# Patient Record
Sex: FEMALE | Race: WHITE | Employment: OTHER | URBAN - METROPOLITAN AREA
[De-identification: names, ages, dates, MRNs, and addresses within clinical notes are randomized per-mention and may not be internally consistent; named-entity substitution may affect disease eponyms.]

---

## 2020-06-10 ENCOUNTER — HOSPITAL ENCOUNTER (OUTPATIENT)
Dept: WOMENS IMAGING | Age: 65
Discharge: HOME | End: 2020-06-10
Payer: MEDICARE

## 2020-06-10 PROCEDURE — 77067 SCR MAMMO BI INCL CAD: CPT

## 2020-06-24 ENCOUNTER — HOSPITAL ENCOUNTER (OUTPATIENT)
Dept: ULTRASOUND IMAGING | Age: 65
Discharge: HOME | End: 2020-06-24
Payer: MEDICARE

## 2020-06-24 ENCOUNTER — HOSPITAL ENCOUNTER (OUTPATIENT)
Dept: WOMENS IMAGING | Age: 65
Discharge: HOME | End: 2020-06-24
Payer: MEDICARE

## 2020-06-24 PROCEDURE — G0279 TOMOSYNTHESIS, MAMMO: HCPCS

## 2020-06-24 PROCEDURE — 76642 ULTRASOUND BREAST LIMITED: CPT

## 2021-01-11 ENCOUNTER — TELEPHONE (OUTPATIENT)
Dept: ORTHOPEDIC SURGERY | Age: 66
End: 2021-01-11

## 2021-01-11 NOTE — TELEPHONE ENCOUNTER
Other Patient is calling and would like a call back to confirm the office has received her records from [facility] SITE with [place].  ph [phone]

## 2021-01-14 ENCOUNTER — OFFICE VISIT (OUTPATIENT)
Dept: ORTHOPEDIC SURGERY | Age: 66
End: 2021-01-14
Payer: MEDICARE

## 2021-01-14 VITALS — HEIGHT: 66 IN | TEMPERATURE: 97.2 F | BODY MASS INDEX: 29.41 KG/M2 | WEIGHT: 183 LBS

## 2021-01-14 DIAGNOSIS — M17.12 PRIMARY OSTEOARTHRITIS OF LEFT KNEE: Primary | ICD-10-CM

## 2021-01-14 PROCEDURE — 99203 OFFICE O/P NEW LOW 30 MIN: CPT | Performed by: ORTHOPAEDIC SURGERY

## 2021-01-14 RX ORDER — LORATADINE 10 MG/1
10 CAPSULE, LIQUID FILLED ORAL DAILY PRN
COMMUNITY

## 2021-01-14 RX ORDER — NAPROXEN SODIUM 220 MG
220 TABLET ORAL 2 TIMES DAILY WITH MEALS
Status: ON HOLD | COMMUNITY
End: 2021-02-23 | Stop reason: SDUPTHER

## 2021-01-14 RX ORDER — CLOBETASOL PROPIONATE 0.46 MG/ML
1 SOLUTION TOPICAL 2 TIMES DAILY
COMMUNITY
Start: 2020-06-23 | End: 2021-01-14 | Stop reason: CLARIF

## 2021-01-14 RX ORDER — RANITIDINE HCL 75 MG
75 TABLET ORAL 2 TIMES DAILY
Status: ON HOLD | COMMUNITY
End: 2021-02-23

## 2021-01-14 RX ORDER — PETROLATUM,WHITE/LANOLIN
1000 OINTMENT (GRAM) TOPICAL
COMMUNITY

## 2021-01-14 RX ORDER — OMEGA-3 FATTY ACIDS/FISH OIL 300-1000MG
200 CAPSULE ORAL
Status: ON HOLD | COMMUNITY
End: 2021-02-23 | Stop reason: SDUPTHER

## 2021-01-14 RX ORDER — OMEPRAZOLE 20 MG/1
20 CAPSULE, DELAYED RELEASE ORAL PRN
COMMUNITY

## 2021-01-14 SDOH — HEALTH STABILITY: MENTAL HEALTH: HOW OFTEN DO YOU HAVE A DRINK CONTAINING ALCOHOL?: NOT ASKED

## 2021-01-14 NOTE — PROGRESS NOTES
[facility]  Office Visit    Chief Complaint: Bilateral knee pain    HPI:  Merrick Fang is a 72 y.o. who is here for evaluation of bilateral knee pain, worse on the left. She has a known history of bilateral knee osteoarthritis for a number of years. She is previously treated for this in [place] before moved back to [place]. She had stem cell steroid injections done in [place] as well as viscosupplementation. Dr. Tamar Aguilar performed bone marrow concentrate stem cell injections in both knees in May 2019. She believes the stem cell therapy has helped. She describes knee pain as far back as 2008. The pain is more medial on both sides and feels aching and stiff. She walks with a limp. She has also seen Dr. April Dozier at [facility], who recommended total knee replacement. Her sister works in physical therapy at [facility] [employer]. She also describes aching muscles in the back of her legs and calves, hip pain which is lateral, pain from her bunions. Medical history includes acid reflux. She does report that nickel jewelry used to make her skin itch. There is no history of DVT. She is not on any blood thinners. She does not have diabetes. There is no problem list on file for this patient. ROS:  Constitutional: denies fever, chills, weight loss  MSK: denies pain in other joints, muscle aches  Neurological: denies numbness, tingling, weakness    Exam:  Temperature 97.2 °F (36.2 °C), temperature source Infrared, height 5' 6\" (1.676 m), weight 183 lb (83 kg). Appearance: sitting in exam room chair, appears to be in no acute distress, awake and alert  Resp: unlabored breathing on room air  Skin: warm, dry and intact with out erythema or significant increased temperature  Neuro: grossly intact both lower extremities. Intact sensation to light touch. Motor exam 4+ to 5/5 in all major motor groups. MSK: BLE - Examination reveals that range of motion is 5 to 125 degrees. There is varus deformity, positive crepitus, positive joint line tenderness, antalgic gait. Neurologically, plantar flexion and dorsiflexion is intact. Pulses palpable distally. 5/5 strength. Imaging:  Bilateral knee radiographs performed previously were reviewed and significant for severe tricompartmental arthritis of the left knee with bone-on-bone offer neuritis of the medial compartment. Left knee x-ray also demonstrate tricompartmental osteoarthritis. Assessment:  Bilateral knee osteoarthritis, more symptomatic on the left knee    Plan:  We discussed the diagnosis and treatment options. She is currently taking Naprosyn for pain. She has been performing home based physical therapy exercises. She has had a number of steroid injections, viscosupplementation, PRP injections. We discussed total knee arthroplasty at length. The operative procedure, alternatives, and risks were discussed in detail with the patient. The risks include but are not limited to: Infection, vessel injury, nerve injury, DVT, pulmonary embolism, implant loosening, need for revision surgery, loss of motion, continued pain. All questions have been answered and no guarantees have been made. The patient is unable to do further physical therapy due to disabling pain. I discussed with the patient the diagnosis in detail and answered all the questions. The patient verbalized understanding of the plan as it has been described above and is in agreement. She will call if she would like to proceed with left total knee arthroplasty. She will need to undergo metal allergy testing preoperatively. Total time spent on today's encounter was between 30-44 minutes. This time included reviewing prior notes, radiographs, and lab results when available, reviewing history obtained by medical assistant, performing history and physical exam, reviewing tests/radiographs with the patient, counseling the patient, ordering medications or tests, documentation in the electronic health record, and coordination of care. This dictation was done with Dragon dictation and may contain mechanical errors related to translation.

## 2021-02-01 ENCOUNTER — PREP FOR PROCEDURE (OUTPATIENT)
Dept: ORTHOPEDIC SURGERY | Age: 66
End: 2021-02-01

## 2021-02-01 ENCOUNTER — TELEPHONE (OUTPATIENT)
Dept: ORTHOPEDIC SURGERY | Age: 66
End: 2021-02-01

## 2021-02-01 DIAGNOSIS — M17.12 PRIMARY OSTEOARTHRITIS OF LEFT KNEE: Primary | ICD-10-CM

## 2021-02-01 NOTE — TELEPHONE ENCOUNTER
Pt wants to schedule L TKA. No letter. Pt also wants to know if there is a video or a place to get information from.     Also wants to know approach, from side or front of knee        [phone]

## 2021-02-01 NOTE — TELEPHONE ENCOUNTER
I called to discuss surgery with the patient. I answered her question about approach and recovery. She is asking about financials. She also has a very remote history of some itchiness while wearing nickel jewelry. We discussed this and I would not pursue metal allergy testing or change implants.

## 2021-02-01 NOTE — LETTER
allergies. SURG   2-23          JET    2-15                        __________________________________________________________________  PRE-OP ORDERS:  ? CBC WITH DIFFERENTIAL                                                ? TYPE AND SCREEN                                                            ? HgB A1C                                                                               ? EKG                                                                                        ? NASAL CULUTRE MRSA  ? UAR/if positive repeat UAR on admission  ? BMP           ALBUMIN AND PREALBUMIN           VITAMIN D LEVELS  ? COAG PROFILE  ? SED RATE  ? PT/OT EVAL AND TEACHING  ? INSTRUCT PT TO STOP ALL NSAIDS, ASPIRIN, BLOOD THINNERS 7 DAYS PRIOR SURGERY  DAY OF SURGERY  ? CEFAZOLIN 2 GM IVPB; IF PATIENT WEIGHS > 80 KG AND SERUM CREATININE <2.5 mg/dl, GIVE 2 GM DOSE WITHIN 1 HOUR OF INCISION. ? IF THE PRE-OP NASAL CULTURE FOR MRSA WAS POSITIVE:   REPEAT NASAL SWAB ON ADMISSION AND ADMINISTER VANCOMYCIN 15 mg x kg, REDUCE THE DOSE OF VANCOMYCIN  MG IVPB IF PT < 55 KG OR SERUM CREATININE > 2mg/dl; also to get Cefazolin 2 GM or wt based  ? All patients will receive preop Cefazolin 2 GM or wt based   ? APPLY KNEE HIGH ANTI-EMBOLIC AND PNEUMO-BOOTS TO UNOPERATIVE  LEG  ? CELEBREX  200 MG  ORALLY  DAY OF SURGERY  ?  ROXICODONE 10MG  ORALLY DAY OF SURGERY  OTHER ORDERS:_______________________________________________________PHYSICIAN SIGNATURE: __   2/1/21                                                                                                       4:10 PM  ________________________DATE:

## 2021-02-06 ENCOUNTER — HOSPITAL ENCOUNTER (OUTPATIENT)
Dept: CT IMAGING | Age: 66
Discharge: HOME | End: 2021-02-06
Payer: MEDICARE

## 2021-02-06 DIAGNOSIS — M17.12 PRIMARY OSTEOARTHRITIS OF LEFT KNEE: ICD-10-CM

## 2021-02-06 PROCEDURE — 73700 CT LOWER EXTREMITY W/O DYE: CPT

## 2021-02-08 ENCOUNTER — TELEPHONE (OUTPATIENT)
Dept: ORTHOPEDIC SURGERY | Age: 66
End: 2021-02-08

## 2021-02-08 ENCOUNTER — PREP FOR PROCEDURE (OUTPATIENT)
Dept: ORTHOPEDICS UNIT | Age: 66
End: 2021-02-08

## 2021-02-08 RX ORDER — OXYCODONE HYDROCHLORIDE 10 MG/1
10 TABLET ORAL ONCE
Status: CANCELLED | OUTPATIENT
Start: 2021-02-08 | End: 2021-02-08

## 2021-02-08 RX ORDER — CELECOXIB 200 MG/1
200 CAPSULE ORAL ONCE
Status: CANCELLED | OUTPATIENT
Start: 2021-02-08 | End: 2021-02-08

## 2021-02-08 NOTE — DISCHARGE INSTR - ACTIVITY
Activity:  ? Elevate your leg if swelling occurs in your ankle. Use elastic wraps/hose until swelling decreases. ? Continue the exercise program as prescribed by physical therapists. ? Take frequent walks. ? Use walker, crutches, or cane with weight bearing instructions as indicated by the physical therapists. ? Take rest periods often. Elevate leg during rest period.   As tolerated with a walker

## 2021-02-12 ENCOUNTER — HOSPITAL ENCOUNTER (OUTPATIENT)
Dept: PHYSICAL THERAPY | Age: 66
Setting detail: THERAPIES SERIES
Discharge: HOME | End: 2021-02-12
Payer: MEDICARE

## 2021-02-12 PROCEDURE — 97110 THERAPEUTIC EXERCISES: CPT

## 2021-02-12 PROCEDURE — 97161 PT EVAL LOW COMPLEX 20 MIN: CPT

## 2021-02-12 NOTE — PLAN OF CARE
[facility]. [address]  Phone: [phone]   Fax:     [phone]          Physical Therapy Certification    Dear Referring Practitioner: Dr Edilson Hernadez,    We had the pleasure of evaluating the following patient for physical therapy services at [facility]. A summary of our findings can be found in the initial assessment below. This includes our plan of care. If you have any questions or concerns regarding these findings, please do not hesitate to contact me at the office phone number checked above. Thank you for the referral.       Physician Signature:_______________________________Date:__________________  By signing above (or electronic signature), therapists plan is approved by physician        Patient: Willie Boone   : 1955   MRN: [id number]  Referring Physician: Referring Practitioner: Dr Edilson Hernadez      Evaluation Date: 2021      Medical Diagnosis Information:  Diagnosis: M17.12 (ICD-10-CM) - Primary osteoarthritis of left knee   Treatment Diagnosis: decreased abilty to ambulate and function                                         Insurance information: PT Insurance Information: bcbs Medicare     Precautions/ Contra-indications:  Latex Allergy:  [x]NO      []YES  Preferred Language for Healthcare:   [x]English       []other:    C-SSRS Triggered by Intake questionnaire (Past 2 wk assessment ):   [x] No, Questionnaire did not trigger screening.   [] Yes, Patient intake triggered C-SSRS Screening      [] C-SSRS Screening completed  [] PCP notified via Epic     SUBJECTIVE: Patient is a 76 y/o female with a hx of left  knee pain for months and is scheduled to have a left tka on 21. She c/o constant aching pain in her left knee which gets worse with steps and prolonged wb. She hopes to have a successful surgery.      Relevant Medical History:Additional Pertinent Hx: cancer  Functional Outcome Measure:[place]- 51    Pain Scale:5/10  Easing factors: rest  Provocative factors: use     Type: [x]Constant   []Intermittent  []Radiating []Localized []other:         Occupation/School- retired    Hospital:    [] Total Hip Replacement [] Right  [] Left  DOS: 2/23/21  [] Not yet scheduled  [x] Total Knee Replacement [] Right  [x] Left    [x] Prehab Eval  [] Prehab D/C  [] Post - OP Visit             Weeks from sx [] Post-op D/C    DME ASSESSMENT:   Current available equipment:    [] Std. Cheri Pedlar       [] Rolling walker      [] 4 wheeled walker     [] Evia Anchors     [] Straight cane     [] Crutches   [] Reacher            [] Sock Aid              [] Shower chair            [] Leg           [] Long handle shoe horn   [] Other:     Equipment needed at discharge from hospital:   [] Std. Cheri Pedlar       [x] Rolling walker      [] 4 wheeled walker     [] Evia Anchors     [x] Straight cane     [] Crutches   [] Reacher            [] Sock Aid              [x] Shower chair            [] Leg           [] Long handle shoe horn   [] Other:       SOCIAL ASSESSMENT:  1. Will you live with someone who can care for you after surgery? [x] Yes  [] No  2. Where is the bathroom located in your post-surgery place of residence? [x] 1st Floor [] 2nd Floor  3. Where is the bedroom located in your post-surgery place of residence? [x] 1st Floor [] 2nd Floor  4. Do you use community supports (home help, meals-on-wheels, district nurse)? [x] None or 1 per week  [] 2 or more per week  5. How many stairs will you have to climb to get in to your place of residence? [x] Less than 5  [] More than 5  6. Have you had a fall in the past year? [] Yes  [x] No     Notes:     Available PT Visits:  Per insurance    BMI:    Height    Weight      FUNCTIONAL ASSESSMENT:   1. Do you use ambulatory aids? [x] None [] Single Point Cane  [] Crutches/Walker/Wheelchair  2.  How far on average can you walk? [x] 2 Blocks or more  [] 1-2 Blocks  [] House bound most of the time  3. What is your physical activity level? [x] Highly Active [] Active  [] Somewhat active  [] Sedentary     OBJECTIVE:   Palpation:-decreased prox tib fib mob, patellar mobs, very tight in itb, add, gastroc, quads    Quad: -good contraction    Functional Mobility/Transfers: ind    Posture: good        Gait: - ambulates with moderate limp, decreased heel toe gait, decreased step length                                                          Reflexes Normal Abnormal Comments                                      PROM AROM    L R L R   Hip Flexion 100 100 100 100   Knee Flexion 115 125 110 125   Knee Extension -10 0 -15 0       Strength (0-5) Left Right   Hip Flexion - supine 4 5 all   Hip Flexion - seated 4    Hip Abduction - sidelyling 45# 60#   Hip Adduction 4    Hip Extension 4    Quads 30# 50#   Hamstrings 4         Flexibility     Hamstrings (90/90) -20    ITB (Ольга) tight    Quads (Ely's)     Hip Flexor Lawence Bernadette)          Girth     Mid patella     Suprapatellar         Joint mobility: patellofemoral    []Normal    [x]Hypo   []Hyper         Functional Testing  Sx Date Prehab Date 2/12/21 Post-op Re-Eval Date  4 week F/U date  8 week F/U date  D/C Date       TUG (sec) 8       30 second sit to stand (reps) 9       6 minute walk (m) na          Balance:    Narrowed DANISH (sec) 5       Semi Tandem (sec)   4             Tandem (sec)   6               SLS (sec) 5              R L R L R L R L R                                     L R L R L R                                     L R L R L R                                                               [x] Patient history, allergies, meds reviewed. Medical chart reviewed. See intake form. Review Of Systems (ROS):  [x]Performed Review of systems (Integumentary, CardioPulmonary, Neurological) by intake and observation. Intake form has been scanned into medical record.  Patient has been instructed to contact their primary care physician regarding ROS issues if not already being addressed at this time. Co-morbidities/Complexities (which will affect course of rehabilitation):  []None           Arthritic conditions   []Rheumatoid arthritis (M05.9)  []Osteoarthritis (M19.91)   Cardiovascular conditions   []Hypertension (I10)  []Hyperlipidemia (E78.5)  []Angina pectoris (I20)  []Atherosclerosis (I70)   Musculoskeletal conditions   []Disc pathology   []Congenital spine pathologies   []Prior surgical intervention  []Osteoporosis (M81.8)  []Osteopenia (M85.8)   Endocrine conditions   []Hypothyroid (E03.9)  []Hyperthyroid Gastrointestinal conditions   []Constipation (I99.16)   Metabolic conditions   []Morbid obesity (E66.01)  []Diabetes type 1(E10.65) or 2 (E11.65)   []Neuropathy (G60.9)     Pulmonary conditions   []Asthma (J45)  []Coughing   []COPD (J44.9)   Psychological Disorders  []Anxiety (F41.9)  []Depression (F32.9)   []Other:   [x]Other:     cancer     Barriers to/and or personal factors that will affect rehab potential:              []Age  []Sex    []Smoker              []Motivation/Lack of Motivation                        []Co-Morbidities              []Cognitive Function, education/learning barriers              []Environmental, home barriers              []profession/work barriers  []past PT/medical experience  []other:  Justification:     Falls Risk Assessment (30 days):   [x] Falls Risk assessed and no intervention required.   [] Falls Risk assessed and Patient requires intervention due to being higher risk   TUG score (>12s at risk):     [] Falls education provided, including         ASSESSMENT:   Functional Impairments:     []Noted lumbar/proximal hip/LE joint hypomobility   [x]Decreased LE functional ROM   [x]Decreased core/proximal hip strength and neuromuscular control   [x]Decreased LE functional strength   [x]Reduced balance/proprioceptive control   []other:      Functional Activity Limitations (from functional questionnaire and intake)   []Reduced ability to tolerate prolonged functional positions   []Reduced ability or difficulty with changes of positions or transfers between positions   []Reduced ability to maintain good posture and demonstrate good body mechanics with sitting, bending, and lifting   []Reduced ability to sleep   [] Reduced ability or tolerance with driving and/or computer work   [x]Reduced ability to perform lifting, carrying tasks   []Reduced ability to squat   []Reduced ability to forward bend   [x]Reduced ability to ambulate prolonged functional periods/distances/surfaces   [x]Reduced ability to ascend/descend stairs   [x]Reduced ability to run, hop, cut or jump   []other:    Participation Restrictions   [x]Reduced participation in self care activities   [x]Reduced participation in home management activities   [x]Reduced participation in work activities   [x]Reduced participation in social activities. [x]Reduced participation in sport/recreation activities. Classification :    [x]Signs/symptoms consistent with post-surgical status including decreased ROM, strength and function.    []Signs/symptoms consistent with joint sprain/strain   []Signs/symptoms consistent with patella-femoral syndrome   [x]Signs/symptoms consistent with knee OA/hip OA   [x]Signs/symptoms consistent with internal derangement of knee/Hip   [x]Signs/symptoms consistent with functional hip weakness/NMR control      []Signs/symptoms consistent with tendinitis/tendinosis    []signs/symptoms consistent with pathology which may benefit from Dry needling      []other:      Prognosis/Rehab Potential:      []Excellent   [x]Good    []Fair   []Poor    Tolerance of evaluation/treatment:    []Excellent   [x]Good    []Fair   []Poor    Physical Therapy Evaluation Complexity Justification  [x] A history of present problem with:  [] no personal factors and/or comorbidities that impact the plan of post-operative outcomes. The patient was educated on and instructed in HEP as listed. The patient was given a detailed handout for exercises to initiate in the hospital post-operatively as well as at home. The discharge plan from the hospital was reviewed with the patient; specifically, to reduce length of hospital stay and to minimize time before reinitiating outpatient physical therapy after surgery. Education regarding early mobility post-operatively in the hospital and emphasis on working with both physical therapy and nursing staff to achieve ambulation goal of 150 feet was provided. The patient was highly encouraged to attend joint class in hospital prior to surgery for further instructions on pre and post-surgical care. Also, education regarding goals and expectations was provided; specifically, knee flexion range of motion greater than or equal to 90 degrees and 0 degrees knee extension to promote improved gait mechanics and ambulation. The patient was encouraged to utilize ice/cold pack after surgery to address pain, minimize swelling as often as possible. It is in my medical opinion that this patient is clear from all physical barriers prior to consideration for surgery, activity modifications prior to and post operatively have been discussed with this patient as well as discharge planning and is cleared for surgery from physical therapy perspective. GOALS:  Patient stated goal:\" I want to learn exercises to help with surgery \"  [] Progressing: [] Met: [] Not Met: [] Adjusted    Therapist goals for Patient:   Short Term Goals: To be achieved in: 1 visit  1. Independent in HEP and progression per patient tolerance, in order to prevent re-injury. [] Progressing: [] Met: [] Not Met: [] Adjusted  2. All patient questions regarding expectations for rehab following upcoming surgery are answered.    [] Progressing: [] Met: [] Not Met: [] Adjusted    Long Term Goals: long term goals to be determined at re-eval following upcoming surgery    Electronically signed by:  Adrian Hayes, PT

## 2021-02-12 NOTE — FLOWSHEET NOTE
Quad/Glute set 10x 6 sec holds         Therapeutic Activity (80624)  Min:          Gait Training (62464)  Min:           Modalities  Min:            Other Therapeutic Activities:   Patient was thoroughly educated on this date regarding prehabilitation goals, importance of PT sessions in improving overall ROM, strength and stability prior to surgery, and how prehabilitation will facilitate improved post-operative outcomes. The patient was educated on and instructed in HEP as listed. The patient was given a detailed handout for exercises to initiate in the hospital post-operatively as well as at home. The discharge plan from the hospital was reviewed with the patient; specifically, to reduce length of hospital stay and to minimize time before reinitiating outpatient physical therapy after surgery. Education regarding early mobility post-operatively in the hospital and emphasis on working with both physical therapy and nursing staff to achieve ambulation goal was provided. The patient was highly encouraged to attend joint class in hospital prior to surgery for further instructions on pre and post-surgical care. Also, education regarding goals and expectations was provided; specifically, knee flexion range of motion greater than or equal to 90 degrees and 0 degrees knee extension to promote improved gait mechanics and ambulation. The patient was encouraged to utilize ice/cold pack after surgery to address pain, minimize swelling as often as possible. It is in my medical opinion that this patient is clear from all physical barriers prior to consideration for surgery, activity modifications prior to and post operatively have been discussed with this patient as well as discharge planning and is cleared for surgery from physical therapy perspective. Home Exercise Program:  Patient instructed in the above exercises for HEP. Patient verbalized/demonstrated understanding and was issued written handout. Therapeutic Exercise and NMR EXR  [] (84110) Provided verbal/tactile cueing for activities related to strengthening, flexibility, endurance, ROM for improvements in LE, proximal hip, and core control with self care, mobility, lifting, ambulation.  [] (29608) Provided verbal/tactile cueing for activities related to improving balance, coordination, kinesthetic sense, posture, motor skill, proprioception  to assist with LE, proximal hip, and core control in self care, mobility, lifting, ambulation and eccentric single leg control.  [address] and Therapeutic Activities:    [] (93212 or 98245) Provided verbal/tactile cueing for activities related to improving balance, coordination, kinesthetic sense, posture, motor skill, proprioception and motor activation to allow for proper function of core, proximal hip and LE with self care and ADLs and functional mobility.   [] (69476) Gait Re-education- Provided training and instruction to the patient for proper LE, core and proximal hip recruitment and positioning and eccentric body weight control with ambulation re-education including up and down stairs     Gait Training:  [] (46029) Provided training and instruction to the patient for proper postural muscle recruitment and positioning with ambulation re-education     Home Exercise Program:    [x] (53471) Reviewed/Progressed HEP activities related to strengthening, flexibility, endurance, ROM of core, proximal hip and LE for functional self-care, mobility, lifting and ambulation/stair navigation   [] (97211)Reviewed/Progressed HEP activities related to improving balance, coordination, kinesthetic sense, posture, motor skill, proprioception of core, proximal hip and LE for self care, mobility, lifting, and ambulation/stair navigation      Manual Treatments:  PROM / STM / Oscillations-Mobs:  G-I, II, III, IV (PA's, Inf., Post.)  [] (90219) Provided manual therapy to mobilize LE, proximal hip and/or LS spine soft tissue/joints for the purpose of modulating pain, promoting relaxation,  increasing ROM, reducing/eliminating soft tissue swelling/inflammation/restriction, improving soft tissue extensibility and allowing for proper ROM for normal function with self care, mobility, lifting and ambulation. Charges:  Timed Code Treatment Minutes: 30   Total Treatment Minutes: 45      [x] EVAL (LOW) 77411 (typically 20 minutes face-to-face)  [] EVAL (MOD) 01999 (typically 30 minutes face-to-face)  [] EVAL (HIGH) 10173 (typically 45 minutes face-to-face)  [] RE-EVAL     [x] JL(93345) x  2   [] Dry needle 1 or 2 Muscles (82366)  [] NMR (14562) x     [] Dry needle 3+ Muscles (69396)  [] Manual (30414) x     [] Ultrasound (51658) x  [] TA (32137) x     [] Mech Traction (43310)  [] ES(attended) (19849)     [] ES (un) (48288):   [] Vasopump (16704) [] Ionto (45120)   [] Gait (83860)  [] Other:        ASSESSMENT:  See eval    Treatment/Activity Tolerance:  [x] Patient tolerated treatment well [] Patient limited by fatique  [] Patient limited by pain  [] Patient limited by other medical complications  [] Other:     Overall Progression Towards Functional goals/ Treatment Progress Update:  [] Patient is progressing as expected towards functional goals listed. [] Progression is slowed due to complexities/Impairments listed. [] Progression has been slowed due to co-morbidities. [x] Plan just implemented, too soon to assess goals progression <30days   [] Goals require adjustment due to lack of progress  [] Patient is not progressing as expected and requires additional follow up with physician  [] Other    Prognosis for POC: [x] Good [] Fair  [] Poor    Patient requires continued skilled intervention: [] Yes  [x] No  GOALS:  Patient stated goal:\" I want to learn exercises to help with surgery \"  []? Progressing: [x]? Met: []? Not Met: []? Adjusted     Therapist goals for Patient:   Short Term Goals: To be achieved in: 1 visit  1.  Independent in HEP and progression per patient tolerance, in order to prevent re-injury. []? Progressing: [x]? Met: []? Not Met: []? Adjusted  2. All patient questions regarding expectations for rehab following upcoming surgery are answered. []? Progressing: [x]? Met: []? Not Met: []? Adjusted         PLAN:Patient seen for 1 visit for HEP. Patient is a good candidate for TKR surgery and would benefit from outpatient rehab following surgery. [] Continue per plan of care [] Alter current plan (see comments)  [] Plan of care initiated [] Hold pending MD visit [x] Discharge    Electronically signed by: Javier Joe PT    Note: If patient does not return for scheduled/recommended follow up visits, this note will serve as a discharge from care along with the most recent update on progress.

## 2021-02-15 ENCOUNTER — HOSPITAL ENCOUNTER (OUTPATIENT)
Dept: PREADMISSION TESTING | Age: 66
Discharge: HOME | End: 2021-02-19
Payer: MEDICARE

## 2021-02-15 DIAGNOSIS — M17.12 PRIMARY OSTEOARTHRITIS OF LEFT KNEE: ICD-10-CM

## 2021-02-15 LAB
ABO/RH: NORMAL
ALBUMIN SERPL-MCNC: 4.3 G/DL (ref 3.4–5)
ANION GAP SERPL CALCULATED.3IONS-SCNC: 11 MMOL/L (ref 3–16)
ANTIBODY SCREEN: NORMAL
APTT: 34 SEC (ref 24.2–36.2)
BASOPHILS ABSOLUTE: 0 K/UL (ref 0–0.2)
BASOPHILS RELATIVE PERCENT: 0.5 %
BILIRUBIN URINE: NEGATIVE
BLOOD, URINE: NEGATIVE
BUN BLDV-MCNC: 15 MG/DL (ref 7–20)
CALCIUM SERPL-MCNC: 10.1 MG/DL (ref 8.3–10.6)
CHLORIDE BLD-SCNC: 102 MMOL/L (ref 99–110)
CLARITY: CLEAR
CO2: 27 MMOL/L (ref 21–32)
COLOR: YELLOW
CREAT SERPL-MCNC: 0.7 MG/DL (ref 0.6–1.2)
EKG ATRIAL RATE: 70 BPM
EKG DIAGNOSIS: NORMAL
EKG P AXIS: 42 DEGREES
EKG P-R INTERVAL: 126 MS
EKG Q-T INTERVAL: 402 MS
EKG QRS DURATION: 92 MS
EKG QTC CALCULATION (BAZETT): 434 MS
EKG R AXIS: -27 DEGREES
EKG T AXIS: 44 DEGREES
EKG VENTRICULAR RATE: 70 BPM
EOSINOPHILS ABSOLUTE: 0.3 K/UL (ref 0–0.6)
EOSINOPHILS RELATIVE PERCENT: 2.9 %
GFR AFRICAN AMERICAN: >60
GFR NON-AFRICAN AMERICAN: >60
GLUCOSE BLD-MCNC: 80 MG/DL (ref 70–99)
GLUCOSE URINE: NEGATIVE MG/DL
HCT VFR BLD CALC: 42.7 % (ref 36–48)
HEMOGLOBIN: 14.6 G/DL (ref 12–16)
INR BLD: 1.04 (ref 0.86–1.14)
KETONES, URINE: NEGATIVE MG/DL
LEUKOCYTE ESTERASE, URINE: NEGATIVE
LYMPHOCYTES ABSOLUTE: 1.6 K/UL (ref 1–5.1)
LYMPHOCYTES RELATIVE PERCENT: 18 %
MCH RBC QN AUTO: 31.5 PG (ref 26–34)
MCHC RBC AUTO-ENTMCNC: 34.3 G/DL (ref 31–36)
MCV RBC AUTO: 92 FL (ref 80–100)
MICROSCOPIC EXAMINATION: NORMAL
MONOCYTES ABSOLUTE: 0.7 K/UL (ref 0–1.3)
MONOCYTES RELATIVE PERCENT: 7.2 %
NEUTROPHILS ABSOLUTE: 6.5 K/UL (ref 1.7–7.7)
NEUTROPHILS RELATIVE PERCENT: 71.4 %
NITRITE, URINE: NEGATIVE
PDW BLD-RTO: 14 % (ref 12.4–15.4)
PH UA: 6 (ref 5–8)
PLATELET # BLD: 238 K/UL (ref 135–450)
PMV BLD AUTO: 8.2 FL (ref 5–10.5)
POTASSIUM SERPL-SCNC: 4.2 MMOL/L (ref 3.5–5.1)
PREALBUMIN: 24.7 MG/DL (ref 20–40)
PROTEIN UA: NEGATIVE MG/DL
PROTHROMBIN TIME: 12.1 SEC (ref 10–13.2)
RBC # BLD: 4.64 M/UL (ref 4–5.2)
SEDIMENTATION RATE, ERYTHROCYTE: 9 MM/HR (ref 0–30)
SODIUM BLD-SCNC: 140 MMOL/L (ref 136–145)
SPECIFIC GRAVITY UA: 1 (ref 1–1.03)
URINE REFLEX TO CULTURE: NORMAL
URINE TYPE: NORMAL
UROBILINOGEN, URINE: 0.2 E.U./DL
VITAMIN D 25-HYDROXY: 37.7 NG/ML
WBC # BLD: 9.1 K/UL (ref 4–11)

## 2021-02-15 PROCEDURE — 81003 URINALYSIS AUTO W/O SCOPE: CPT

## 2021-02-15 PROCEDURE — 85610 PROTHROMBIN TIME: CPT

## 2021-02-15 PROCEDURE — 80048 BASIC METABOLIC PNL TOTAL CA: CPT

## 2021-02-15 PROCEDURE — 86850 RBC ANTIBODY SCREEN: CPT

## 2021-02-15 PROCEDURE — 85730 THROMBOPLASTIN TIME PARTIAL: CPT

## 2021-02-15 PROCEDURE — 87081 CULTURE SCREEN ONLY: CPT

## 2021-02-15 PROCEDURE — 85652 RBC SED RATE AUTOMATED: CPT

## 2021-02-15 PROCEDURE — 84134 ASSAY OF PREALBUMIN: CPT

## 2021-02-15 PROCEDURE — 93010 ELECTROCARDIOGRAM REPORT: CPT | Performed by: INTERNAL MEDICINE

## 2021-02-15 PROCEDURE — 82306 VITAMIN D 25 HYDROXY: CPT

## 2021-02-15 PROCEDURE — 86900 BLOOD TYPING SEROLOGIC ABO: CPT

## 2021-02-15 PROCEDURE — 82040 ASSAY OF SERUM ALBUMIN: CPT

## 2021-02-15 PROCEDURE — 86901 BLOOD TYPING SEROLOGIC RH(D): CPT

## 2021-02-15 PROCEDURE — 83036 HEMOGLOBIN GLYCOSYLATED A1C: CPT

## 2021-02-15 PROCEDURE — 85025 COMPLETE CBC W/AUTO DIFF WBC: CPT

## 2021-02-15 PROCEDURE — 93005 ELECTROCARDIOGRAM TRACING: CPT

## 2021-02-15 NOTE — PROGRESS NOTES
Patient attended JET class on 2/15/21. Patient verified surgery for Total knee replacement and received patient information and educational JET folder including education handouts on hand hygiene and preventing constipation. Interviews completed by PT, OT,  and PAT. Labs and Tests completed as ordered/necessary. Patient given handout instructions on Pre-operative Showering techniques and the use of anti-septic 3 days before surgery. Anti-septic bottle given to patient to take home. Patient states no further questions or concerns. DOS: 2/23/21  Dr Carol Holt: home with [facility] and spouse gene ;if applicable. Per Patient Will see/Saw PCP on 2/18/21.        Electronically signed by Sylvester Parker RN on 2/15/2021 at 1:37 PM

## 2021-02-16 LAB
ESTIMATED AVERAGE GLUCOSE: 99.7 MG/DL
HBA1C MFR BLD: 5.1 %

## 2021-02-17 ENCOUNTER — TELEPHONE (OUTPATIENT)
Dept: ORTHOPEDIC SURGERY | Age: 66
End: 2021-02-17

## 2021-02-17 LAB — MRSA CULTURE ONLY: NORMAL

## 2021-02-17 NOTE — TELEPHONE ENCOUNTER
Donte At [address] [place] [address] 2/15/2021  9:57 AM   Telephone Encounter              Added by:  Ileana Gonzalez    []Naty for details  PENDING DENIAL  Date: 2/23/2021  Type of SX: Outpatient  Location: [facility]  CPT: 61930  SX area: Lt knee  REASON: Lack of conserative treatment  Peer to peer: [phone]  Reference # [id number]    SURG 2-23-21

## 2021-02-17 NOTE — PROGRESS NOTES
PRE-OP INSTRUCTIONS     · Do not eat or drink anything after 12:00 midnight prior to surgery. This includes water, chewing gum, mints and ice chips. You may brush your teeth and gargle the morning of surgery but DO  NOT SWALLOW THE WATER. Take the following medications with a small sip of water on the morning of surgery:    · If you use an inhaler, please use it the morning of surgery and bring with you to hospital.    · You may be asked to stop blood thinners such as:  Coumadin, Plavix, Fragmin and lovenox. Please check with your doctor before stopping these or any other medications. · Aspirin, ibuprofen, advil and naproxen, any anti-inflammatory products should be stopped for a week prior to your surgery. · Do not smoke and do not drink any alcoholic beverages 24 hours prior to your surgery. · Please do not wear any jewelry or body piercings on the day of surgery. · Please wear something simple, loose fitting clothing to the hospital.  Do not wear any make-up(including eye make-up) or nail polish on your fingers and toes. · As part of our patient safety program to minimize surgical infections, we ask you to do the followin. Please notify your surgeon if you develop any illness between now                          and the day of your surgery. This includes a cough, cold, fever, sore                       throat, nausea, vomiting, diarrhea, etc.  Also please notify your                                  surgeon if you experience dizziness, shortness of breath or                       Blurred vision between now and the time of your surgery. 2.  Please notify your surgeon of any open or redden areas that may                        look infected                     3.  DO NOT shave your operative site 96 hours(four days) prior to                                  surgery. 4.  Shower the week before surgery with an antibacterial soap, such as                       dial, safeguard, etc.                         5.  Three(3) days prior to your surgery, cleanse the operative site with                          Hibiclens(anti-microbial soap). This soap may dry your skin, please                          do not apply any oils or lotions     · Please bring your insurance card and picture ID day of surgery    · If you have a living will or durable power of attorny. Please bring in a copy of your advanced directives. · If you have dentures, they will be removed before going to the OR, we will provide you with a container. If you wear contact lenses or glasses, they will be removes, please bring a case for them. · Have you seen your family doctor for a pre-op history and physical.      · Surgery scheduler will call you 48 hours prior to your surgery to notify you of the time of your surgery and the time you will need to be at hospital...patients are asked to arrive 21/2 hours prior to surgery. ·  Please call Pre-Admission testing if you have any further questions. [id number] [facility] testing phone number:  [phone]      Thank You for choosing [facility]!!  Preoperative Screening for Elective Surgery/Invasive Procedures While COVID-19 present in the community    ? Have you tested positive or have been told to self-isolate for COVID-19 like symptoms within the past 28 days?no  ? Do you currently have any of the following symptoms?no  o Fever >100.0 F or 99.9 F in immunocompromised patients? o New onset cough, shortness of breath or difficulty breathing?  o New onset sore throat, myalgia (muscle aches and pains), headache, loss of taste/smell or diarrhea? ? Have you had a potential exposure to COVID-19 within the past 14 days by:  o Close contact with a confirmed case? o Close contact with a healthcare worker,  or essential infrastructure worker (grocery store, [employer], gas station, public utilities or transportation)? o Do you reside in a congregate setting such as; skilled nursing facility, adult home, correctional facility, homeless shelter or other institutional setting?  o Have you had recent travel to a known COVID-19 hotspot? Indicate if the patient has a positive screen by answering yes to one or more of the above questions. Patients who test positive or screen positive prior to surgery or on the day of surgery should be evaluated in conjunction with the surgeon/proceduralist/anesthesiologist to determine the urgency of the procedure. Remember. [place] Safety First! Call before you Fall

## 2021-02-17 NOTE — PROGRESS NOTES
Patient stated she was told By the surgeon's office she will have a rapid Covid test the day of surgery.  Electronically signed by Rogelio Nunez RN on 2/17/21 at 2:10 PM EST

## 2021-02-18 ENCOUNTER — OFFICE VISIT (OUTPATIENT)
Dept: ORTHOPEDIC SURGERY | Age: 66
End: 2021-02-18
Payer: MEDICARE

## 2021-02-18 ENCOUNTER — TELEPHONE (OUTPATIENT)
Dept: ORTHOPEDIC SURGERY | Age: 66
End: 2021-02-18

## 2021-02-18 VITALS — HEART RATE: 85 BPM | DIASTOLIC BLOOD PRESSURE: 77 MMHG | SYSTOLIC BLOOD PRESSURE: 122 MMHG | TEMPERATURE: 96.9 F

## 2021-02-18 DIAGNOSIS — M17.12 PRIMARY OSTEOARTHRITIS OF LEFT KNEE: Primary | ICD-10-CM

## 2021-02-18 PROCEDURE — 99214 OFFICE O/P EST MOD 30 MIN: CPT | Performed by: ORTHOPAEDIC SURGERY

## 2021-02-18 RX ORDER — DIPHENHYDRAMINE HYDROCHLORIDE 50 MG/30ML
LIQUID ORAL PRN
COMMUNITY

## 2021-02-18 RX ORDER — ASPIRIN 325 MG
325 TABLET ORAL EVERY 6 HOURS PRN
Status: ON HOLD | COMMUNITY
End: 2021-02-23 | Stop reason: HOSPADM

## 2021-02-18 RX ORDER — KETOCONAZOLE 20 MG/ML
SHAMPOO TOPICAL DAILY PRN
COMMUNITY

## 2021-02-18 RX ORDER — CALCIUM CARBONATE 200(500)MG
1 TABLET,CHEWABLE ORAL PRN
COMMUNITY

## 2021-02-18 RX ORDER — CLOBETASOL PROPIONATE 0.5 MG/G
CREAM TOPICAL
COMMUNITY

## 2021-02-18 NOTE — CARE COORDINATION
DATE OF JET CLASS INTERVIEW: 2/18/21    ACCOMPANIED TODAY BY:  Phone Interview    FIRST JOINT REPLACEMENT? Yes  TYPE AND DATE OF LAST JOINT REPLACEMENT? TRANSPORTATION:  [employer]  Ph: [phone]    STEPS INTO HOME?  2 steps with railing    STEPS TO BATHROOM/BEDROOM? All on one level    DME:  Needs walker. Shower has seat and grab bar. Has raised toilet seat. CHOICES FOR HHC, DME VENDORS AND SKILLED/ REHAB FACILITIES PROVIDED TO PT DURING THIS INTERVIEW. DISCHARGE PLAN:/ INCLUDING WHO WILL BE STAYING WITH YOU AT HOME?  will be home. LENGTH OF STAY HAS BEEN DISCUSSED WITH THE PT, APPROPRIATE TO HIS/ HER PROCEDURE. PT HAS BEEN INFORMED THAT THEY WILL BE DISCHARGED WHEN THE PHYSICIAN DEEMS THEM MEDICALLY READY. MOST PATIENTS CAN EXPECT  TO BE IN THE HOSPITAL ONE NIGHT AS AN OBSERVATION ONLY, OR 1-2 DAYS AS AN ADMISSION FOR THOSE WITH MEDICAL HEALTH  ISSUES/COMPLICATIONS. HOME CARE:  [facility]    SNF/REHAB: n/a        PT AGREEABLE TO MEDS TO BEDS FROM [email or web address]. Agreeable to meds to bed program.    Contact information for case management provided to pt. Will follow with therapies and social service. Nayely Patel Sr.  Administrative Assist, Case Management  [phone]  Electronically signed by Nayely Patel on 2/18/2021 at 12:27 PM

## 2021-02-18 NOTE — PROGRESS NOTES
[facility]  Office Visit    Chief Complaint: Left knee pain    HPI:  Ysabel Brown is a 77 y.o. who is here had a planned left total knee arthroplasty. For review, she has a known history of bilateral knee osteoarthritis for a number of years. She was previously treated for this in [place] before moved back to [address] [place] [address]. She had stem cell steroid injections done in [place] as well as viscosupplementation. Dr. Queen Tyler performed bone marrow concentrate stem cell injections in both knees in May 2019. She believes the stem cell therapy has helped. She describes knee pain as far back as 2008. The pain is more medial on both sides and feels aching and stiff. She walks with a limp. She has also seen Dr. Georgia Toney at [facility], who recommended total knee replacement. Her sister works in physical therapy at [facility]. She also describes aching muscles in the back of her legs and calves, hip pain which is lateral, pain from her bunions. Medical history includes acid reflux. She does report that nickel jewelry used to make her skin itch. There is no history of DVT. She is not on any blood thinners. She does not have diabetes. There is no problem list on file for this patient. ROS:  Constitutional: denies fever, chills, weight loss  MSK: denies pain in other joints, muscle aches  Neurological: denies numbness, tingling, weakness    Exam:  Blood pressure 122/77, pulse 85, temperature 96.9 °F (36.1 °C), temperature source Temporal.    Appearance: sitting in exam room chair, appears to be in no acute distress, awake and alert  Resp: unlabored breathing on room air  Skin: warm, dry and intact with out erythema or significant increased temperature  Neuro: grossly intact both lower extremities. Intact sensation to light touch. Motor exam 4+ to 5/5 in all major motor groups. MSK: BLE - Examination reveals that range of motion is 5 to 125 degrees. There is varus deformity, positive crepitus, positive joint line tenderness, antalgic gait. Neurologically, plantar flexion and dorsiflexion is intact. Pulses palpable distally. 5/5 strength. Imaging:  Bilateral knee radiographs performed previously were reviewed and significant for severe tricompartmental arthritis of the left knee with bone-on-bone offer neuritis of the medial compartment. Left knee x-ray also demonstrate tricompartmental osteoarthritis. Labs:  Reviewed and within normal limits. Assessment:  Bilateral knee osteoarthritis, more symptomatic on the left knee    Plan:  We discussed the diagnosis and treatment options. We discussed total knee arthroplasty at length. The operative procedure, alternatives, and risks were discussed in detail with the patient. The risks include but are not limited to: Infection, vessel injury, nerve injury, DVT, pulmonary embolism, implant loosening, need for revision surgery, loss of motion, continued pain. All questions have been answered and no guarantees have been made. The patient is unable to do further physical therapy due to disabling pain. I discussed with the patient the diagnosis in detail and answered all the questions. The patient verbalized understanding of the plan as it has been described above and is in agreement. Plan for left total knee arthroplasty. Total time spent on today's encounter was between 30-39 minutes. This time included reviewing prior notes, radiographs, and lab results when available, reviewing history obtained by medical assistant, performing history and physical exam, reviewing tests/radiographs with the patient, counseling the patient, ordering medications or tests, documentation in the electronic health record, and coordination of care. This dictation was done with Dragon dictation and may contain mechanical errors related to translation.

## 2021-02-19 NOTE — TELEPHONE ENCOUNTER
Jennyanita Kennedy [id number]    Date: 2/23/2021 thru 4/23/2021  Type of SX:  Outpatient  Location: [facility]  CPT: 32815   DX Code: M17.12  SX area: Lt knee  Insurance: [employer]

## 2021-02-19 NOTE — DISCHARGE INSTR - COC
[facility]) Continuity of Care Form    Patient Name:  Shari Mao  : 1955    MRN:  [id number]    Admit date:  (Not on file)  Discharge date:  2024    Code Status Order: No Order  Advance Directives: {YES OR NO:}    Admitting Physician: Lacie Vazquez MD  PCP: Lisa Guzman MD    Discharging Nurse: [facility] Unit/Room#: No information available for this encounter. Discharging Unit Phone Number: [phone]    Emergency Contact:        Past Surgical History:  Past Surgical History:   Procedure Laterality Date    COLONOSCOPY  2020    Dr Johan Lucero      stem cell injections bilateral knee's       Immunization History: There is no immunization history on file for this patient. Active Problems:  Active Problems:    * No active hospital problems. *  Resolved Problems:    * No resolved hospital problems. *      Isolation/Infection:       Nurse Assessment:  Last Vital Signs:Ht 5' 6\" (1.676 m)   Wt 180 lb (81.6 kg)   BMI 29.05 kg/m²   Last documented pain score (0-10 scale):    Last Weight:   Wt Readings from Last 1 Encounters:   21 183 lb (83 kg)     Mental Status:  oriented, alert, coherent, logical, thought processes intact and able to concentrate and follow conversation     IV Access:  - None    Nursing Mobility/ADLs:  Walking   Assisted  Transfer  Assisted  Bathing  Assisted  Dressing  Assisted  Toileting  Assisted  Feeding  [facility] Delivery   whole    Wound Care Documentation and Therapy:  Keep glued Prineo dressing clean and intact. DO NOT remove. Prineo is waterproof for showering. Doctor will evaluated dressing for removal at office visit 2 weeks after surgery        Elimination:  Urinary Catheter: None   Colostomy/Ileostomy: No  Continence:   · Bowel:  Yes  · Bladder: Yes  Date of Last BM: 2021    Intake/Output Summary (Last 24 hours) No intake or output data in the 24 hours ending 02/19/21 7707  Safety Concerns: At Risk for Falls    Impairments/Disabilities:      None    Nutrition Therapy:  Current Nutrition Therapy: No diet orders on file  Routes of Feeding: Oral  Liquids: No Restrictions  Daily Fluid Restriction: no  Last Modified Barium Swallow with Video (Video Swallowing Test): not done    Treatments at the Time of Hospital Discharge:   Respiratory Treatments: none  Oxygen Therapy:  is not on home oxygen therapy. Ventilator:    - No ventilator support    Lab orders for discharge:        Rehab Therapies: Physical Therapy, Occupational Therapy and nursing care  Weight Bearing Status/Restrictions: No weight bearing restirctions  Other Medical Equipment (for information only, NOT a DME order):  Rolling walker  Other Treatments: ASA 81mg twice at day for 14 days for DVT prophylaxis , bilateral IGOR hose for 2 weeks after surgery    Patient's personal belongings (please select all that are sent with patient):  None    RN SIGNATURE:  Electronically signed by Vivianne Klinefelter, RN on 2/24/21 at 4:17 PM EST    PHYSICIAN SECTION    Prognosis: Good    Condition at Discharge: Stable    Rehab Potential (if transferring to Rehab): Good    Physician Certification: I certify the above orders, information, and transfer of En Painter is necessary for the continuing treatment of the diagnosis listed and that he requires [address] for less 30 days.      Update Admission H&P: No change in H&P    PHYSICIAN SIGNATURE:  Electronically signed by ZEN Lopez CNP on 2/19/21 at 11:47 AM EST/ Dr Nette Marrero Status Date: ***    [facility] Score:    Discharging to Facility/ Agency   Name:  [facility]    Address: [address] [place] [address]., [address] [place] [address], [place] [address]  Phone: [phone]  Fax: [phone]    · / signature: {Esignature:[id number]:::0}          Followup with Dr Edilson Hernadez 2 weeks after surgery in office   [address] [place] [address] Rd, [address] [facility], [address] [place] [address],   [phone]     DISCHARGE INSTRUCTIONS FOR TOTAL KNEE REPLACEMENT  Activity:  ? Elevate your leg if swelling occurs in your ankle. Use elastic wraps/hose until swelling decreases. ? Continue the exercise program as prescribed by physical therapists. ? Take frequent walks. ? Use walker, crutches, or cane with weight bearing instructions as indicated by the physical therapists. ? Take rest periods often. Elevate leg during rest period. Wound Care:  ? Cover the wound with a sterile gauze dressing and change daily as long as there is drainage. ? Do not scrub wound. Pat it dry with a soft towel. ? Dont apply any lotions or creams to your wound. ? Check the incision every day for redness, swelling, or increase in drainage. Diet:  ? You can resume your normal diet. There are no limits on your diet due to your surgery. ? Pain pills and activity changes may lead to constipation. To prevent this, use prune juice or bran cereals liberally. You may need to use a laxative such as Dulcolax, Senokot, or Milk of Magnesia. ? Drink plenty of fluids. Medications:  ? Take pain pills if needed to maintain comfort. ? Never drive while taking pain medicine. ? Avoid over the counter medications until checking with your doctor. ? Resume previous medications as instructed by your doctor. You will be on aspirin or Eliquis  for 14 days only. Stay off other anti-inflammatory medications (except Celebrex)  Call Your Doctor If:  ? You have increased pain not controlled by medications. ? Excessive swelling in your ankle. ? You develop numbness, tingling, or decreased movement. ? You have a fever greater than 100 degrees for a day or over 101 degrees at any one time. ? Your wound becomes more reddened, starts draining, or opens. ? If you fall. You have any questions about your recovery. Inform your family doctor/dentist or any other doctor who cares for you in the future that you have a joint replacement. You may need antibiotics for dental or surgical procedures if there is any evidence of infection present. ? If you have required the use of insulin to control your blood sugar after surgery, follow up with your family doctor. ? Call your surgeons office to schedule your appointment to be seen after surgery. ? Make your appointment to continue physical therapy per doctors orders. ?  Smoking cessation assistance can be obtained from your family doctor or by calling [place] @ [phone]    _______________________________   _____   _______________________  ____                Patient Signature              Date      Witness                               Date

## 2021-02-22 ENCOUNTER — ANESTHESIA EVENT (OUTPATIENT)
Dept: OPERATING ROOM | Age: 66
End: 2021-02-22
Payer: MEDICARE

## 2021-02-22 DIAGNOSIS — M17.12 PRIMARY OSTEOARTHRITIS OF LEFT KNEE: Primary | ICD-10-CM

## 2021-02-22 PROCEDURE — MISCCOLD COLD THERAPY UNIT AND PAD: Performed by: ORTHOPAEDIC SURGERY

## 2021-02-23 ENCOUNTER — APPOINTMENT (OUTPATIENT)
Dept: GENERAL RADIOLOGY | Age: 66
End: 2021-02-23
Attending: ORTHOPAEDIC SURGERY
Payer: MEDICARE

## 2021-02-23 ENCOUNTER — ANESTHESIA (OUTPATIENT)
Dept: OPERATING ROOM | Age: 66
End: 2021-02-23
Payer: MEDICARE

## 2021-02-23 ENCOUNTER — HOSPITAL ENCOUNTER (OUTPATIENT)
Age: 66
Discharge: HOME | End: 2021-02-24
Attending: ORTHOPAEDIC SURGERY | Admitting: ORTHOPAEDIC SURGERY
Payer: MEDICARE

## 2021-02-23 VITALS
TEMPERATURE: 98.6 F | DIASTOLIC BLOOD PRESSURE: 46 MMHG | RESPIRATION RATE: 14 BRPM | OXYGEN SATURATION: 97 % | SYSTOLIC BLOOD PRESSURE: 74 MMHG

## 2021-02-23 DIAGNOSIS — M17.9 OSTEOARTHRITIS OF KNEE, UNSPECIFIED: Primary | ICD-10-CM

## 2021-02-23 DIAGNOSIS — M17.12 ARTHRITIS OF LEFT KNEE: ICD-10-CM

## 2021-02-23 LAB
ABO/RH: NORMAL
ANTIBODY SCREEN: NORMAL
GLUCOSE BLD-MCNC: 151 MG/DL (ref 70–99)
GLUCOSE BLD-MCNC: 97 MG/DL (ref 70–99)
PERFORMED ON: ABNORMAL
PERFORMED ON: NORMAL
SARS-COV-2, NAAT: NOT DETECTED

## 2021-02-23 PROCEDURE — C1776 JOINT DEVICE (IMPLANTABLE): HCPCS | Performed by: ORTHOPAEDIC SURGERY

## 2021-02-23 PROCEDURE — 2580000003 HC RX 258: Performed by: ORTHOPAEDIC SURGERY

## 2021-02-23 PROCEDURE — 6360000002 HC RX W HCPCS: Performed by: ORTHOPAEDIC SURGERY

## 2021-02-23 PROCEDURE — 97116 GAIT TRAINING THERAPY: CPT

## 2021-02-23 PROCEDURE — C9290 INJ, BUPIVACAINE LIPOSOME: HCPCS | Performed by: ORTHOPAEDIC SURGERY

## 2021-02-23 PROCEDURE — 73560 X-RAY EXAM OF KNEE 1 OR 2: CPT

## 2021-02-23 PROCEDURE — 3600000005 HC SURGERY LEVEL 5 BASE: Performed by: ORTHOPAEDIC SURGERY

## 2021-02-23 PROCEDURE — 86901 BLOOD TYPING SEROLOGIC RH(D): CPT

## 2021-02-23 PROCEDURE — 87635 SARS-COV-2 COVID-19 AMP PRB: CPT

## 2021-02-23 PROCEDURE — 86850 RBC ANTIBODY SCREEN: CPT

## 2021-02-23 PROCEDURE — 2720000010 HC SURG SUPPLY STERILE: Performed by: ORTHOPAEDIC SURGERY

## 2021-02-23 PROCEDURE — 3600000015 HC SURGERY LEVEL 5 ADDTL 15MIN: Performed by: ORTHOPAEDIC SURGERY

## 2021-02-23 PROCEDURE — 76942 ECHO GUIDE FOR BIOPSY: CPT | Performed by: ANESTHESIOLOGY

## 2021-02-23 PROCEDURE — 2580000003 HC RX 258

## 2021-02-23 PROCEDURE — 7100000001 HC PACU RECOVERY - ADDTL 15 MIN: Performed by: ORTHOPAEDIC SURGERY

## 2021-02-23 PROCEDURE — 3700000000 HC ANESTHESIA ATTENDED CARE: Performed by: ORTHOPAEDIC SURGERY

## 2021-02-23 PROCEDURE — 97161 PT EVAL LOW COMPLEX 20 MIN: CPT

## 2021-02-23 PROCEDURE — 2580000003 HC RX 258: Performed by: ANESTHESIOLOGY

## 2021-02-23 PROCEDURE — 86900 BLOOD TYPING SEROLOGIC ABO: CPT

## 2021-02-23 PROCEDURE — 88311 DECALCIFY TISSUE: CPT

## 2021-02-23 PROCEDURE — 97530 THERAPEUTIC ACTIVITIES: CPT

## 2021-02-23 PROCEDURE — 6370000000 HC RX 637 (ALT 250 FOR IP): Performed by: ORTHOPAEDIC SURGERY

## 2021-02-23 PROCEDURE — 3700000001 HC ADD 15 MINUTES (ANESTHESIA): Performed by: ORTHOPAEDIC SURGERY

## 2021-02-23 PROCEDURE — 2500000003 HC RX 250 WO HCPCS

## 2021-02-23 PROCEDURE — 2700000000 HC OXYGEN THERAPY PER DAY

## 2021-02-23 PROCEDURE — C1713 ANCHOR/SCREW BN/BN,TIS/BN: HCPCS | Performed by: ORTHOPAEDIC SURGERY

## 2021-02-23 PROCEDURE — 6360000002 HC RX W HCPCS: Performed by: ANESTHESIOLOGY

## 2021-02-23 PROCEDURE — 94761 N-INVAS EAR/PLS OXIMETRY MLT: CPT

## 2021-02-23 PROCEDURE — 6360000002 HC RX W HCPCS

## 2021-02-23 PROCEDURE — 64447 NJX AA&/STRD FEMORAL NRV IMG: CPT | Performed by: ANESTHESIOLOGY

## 2021-02-23 PROCEDURE — 88305 TISSUE EXAM BY PATHOLOGIST: CPT

## 2021-02-23 PROCEDURE — 2709999900 HC NON-CHARGEABLE SUPPLY: Performed by: ORTHOPAEDIC SURGERY

## 2021-02-23 PROCEDURE — 7100000000 HC PACU RECOVERY - FIRST 15 MIN: Performed by: ORTHOPAEDIC SURGERY

## 2021-02-23 DEVICE — BASEPLATE TIB SZ 5 AP49MM ML74MM KNEE TRITANIUM 4 CRUCFRM: Type: IMPLANTABLE DEVICE | Site: KNEE | Status: FUNCTIONAL

## 2021-02-23 DEVICE — IMPLANTABLE DEVICE: Type: IMPLANTABLE DEVICE | Site: KNEE | Status: FUNCTIONAL

## 2021-02-23 DEVICE — COMPONENT PAT DIA35MM THK10MM SUPERIOR/INFERIOR KNEE: Type: IMPLANTABLE DEVICE | Site: KNEE | Status: FUNCTIONAL

## 2021-02-23 DEVICE — INSERT TIB BEAR SZ 5 THK11MM KNEE X3 CNDYL STABILIZING: Type: IMPLANTABLE DEVICE | Site: KNEE | Status: FUNCTIONAL

## 2021-02-23 RX ORDER — SODIUM CHLORIDE 0.9 % (FLUSH) 0.9 %
10 SYRINGE (ML) INJECTION PRN
Status: DISCONTINUED | OUTPATIENT
Start: 2021-02-23 | End: 2021-02-23 | Stop reason: HOSPADM

## 2021-02-23 RX ORDER — OXYCODONE HYDROCHLORIDE 5 MG/1
5-10 TABLET ORAL EVERY 6 HOURS PRN
Qty: 40 TABLET | Refills: 0 | Status: SHIPPED | OUTPATIENT
Start: 2021-02-23 | End: 2021-03-08 | Stop reason: SDUPTHER

## 2021-02-23 RX ORDER — PROPOFOL 10 MG/ML
INJECTION, EMULSION INTRAVENOUS PRN
Status: DISCONTINUED | OUTPATIENT
Start: 2021-02-23 | End: 2021-02-23 | Stop reason: SDUPTHER

## 2021-02-23 RX ORDER — EPHEDRINE SULFATE/0.9% NACL/PF 50 MG/5 ML
SYRINGE (ML) INTRAVENOUS PRN
Status: DISCONTINUED | OUTPATIENT
Start: 2021-02-23 | End: 2021-02-23 | Stop reason: SDUPTHER

## 2021-02-23 RX ORDER — ASPIRIN 81 MG/1
81 TABLET ORAL 2 TIMES DAILY
Qty: 28 TABLET | Refills: 0 | Status: SHIPPED | OUTPATIENT
Start: 2021-02-23 | End: 2021-03-09

## 2021-02-23 RX ORDER — VASOPRESSIN 20 U/ML
INJECTION PARENTERAL PRN
Status: DISCONTINUED | OUTPATIENT
Start: 2021-02-23 | End: 2021-02-23 | Stop reason: SDUPTHER

## 2021-02-23 RX ORDER — SODIUM CHLORIDE 0.9 % (FLUSH) 0.9 %
10 SYRINGE (ML) INJECTION EVERY 12 HOURS SCHEDULED
Status: DISCONTINUED | OUTPATIENT
Start: 2021-02-23 | End: 2021-02-23 | Stop reason: HOSPADM

## 2021-02-23 RX ORDER — ACETAMINOPHEN 325 MG/1
650 TABLET ORAL EVERY 6 HOURS
Status: DISCONTINUED | OUTPATIENT
Start: 2021-02-23 | End: 2021-02-24 | Stop reason: HOSPADM

## 2021-02-23 RX ORDER — DEXTROSE MONOHYDRATE 50 MG/ML
100 INJECTION, SOLUTION INTRAVENOUS PRN
Status: DISCONTINUED | OUTPATIENT
Start: 2021-02-23 | End: 2021-02-24

## 2021-02-23 RX ORDER — DEXTROSE MONOHYDRATE 25 G/50ML
12.5 INJECTION, SOLUTION INTRAVENOUS PRN
Status: DISCONTINUED | OUTPATIENT
Start: 2021-02-23 | End: 2021-02-24

## 2021-02-23 RX ORDER — MAGNESIUM HYDROXIDE 1200 MG/15ML
LIQUID ORAL CONTINUOUS PRN
Status: COMPLETED | OUTPATIENT
Start: 2021-02-23 | End: 2021-02-23

## 2021-02-23 RX ORDER — OMEGA-3 FATTY ACIDS/FISH OIL 300-1000MG
200 CAPSULE ORAL DAILY PRN
Qty: 120 CAPSULE | Refills: 3
Start: 2021-02-23

## 2021-02-23 RX ORDER — MIDAZOLAM HYDROCHLORIDE 1 MG/ML
INJECTION INTRAMUSCULAR; INTRAVENOUS PRN
Status: DISCONTINUED | OUTPATIENT
Start: 2021-02-23 | End: 2021-02-23 | Stop reason: SDUPTHER

## 2021-02-23 RX ORDER — LIDOCAINE HYDROCHLORIDE 20 MG/ML
INJECTION, SOLUTION EPIDURAL; INFILTRATION; INTRACAUDAL; PERINEURAL PRN
Status: DISCONTINUED | OUTPATIENT
Start: 2021-02-23 | End: 2021-02-23 | Stop reason: SDUPTHER

## 2021-02-23 RX ORDER — NICOTINE POLACRILEX 4 MG
15 LOZENGE BUCCAL PRN
Status: DISCONTINUED | OUTPATIENT
Start: 2021-02-23 | End: 2021-02-24

## 2021-02-23 RX ORDER — OXYCODONE HYDROCHLORIDE 5 MG/1
5 TABLET ORAL EVERY 4 HOURS PRN
Status: DISCONTINUED | OUTPATIENT
Start: 2021-02-23 | End: 2021-02-24 | Stop reason: HOSPADM

## 2021-02-23 RX ORDER — OXYCODONE HYDROCHLORIDE 10 MG/1
10 TABLET ORAL ONCE
Status: COMPLETED | OUTPATIENT
Start: 2021-02-23 | End: 2021-02-23

## 2021-02-23 RX ORDER — SENNA AND DOCUSATE SODIUM 50; 8.6 MG/1; MG/1
1 TABLET, FILM COATED ORAL 2 TIMES DAILY
Status: DISCONTINUED | OUTPATIENT
Start: 2021-02-23 | End: 2021-02-24 | Stop reason: HOSPADM

## 2021-02-23 RX ORDER — OXYCODONE HYDROCHLORIDE AND ACETAMINOPHEN 5; 325 MG/1; MG/1
2 TABLET ORAL PRN
Status: DISCONTINUED | OUTPATIENT
Start: 2021-02-23 | End: 2021-02-23 | Stop reason: HOSPADM

## 2021-02-23 RX ORDER — ONDANSETRON 2 MG/ML
4 INJECTION INTRAMUSCULAR; INTRAVENOUS
Status: DISCONTINUED | OUTPATIENT
Start: 2021-02-23 | End: 2021-02-23 | Stop reason: HOSPADM

## 2021-02-23 RX ORDER — MORPHINE SULFATE 4 MG/ML
4 INJECTION, SOLUTION INTRAMUSCULAR; INTRAVENOUS
Status: DISCONTINUED | OUTPATIENT
Start: 2021-02-23 | End: 2021-02-24 | Stop reason: HOSPADM

## 2021-02-23 RX ORDER — SODIUM CHLORIDE 0.9 % (FLUSH) 0.9 %
10 SYRINGE (ML) INJECTION EVERY 12 HOURS SCHEDULED
Status: DISCONTINUED | OUTPATIENT
Start: 2021-02-23 | End: 2021-02-24 | Stop reason: HOSPADM

## 2021-02-23 RX ORDER — OXYCODONE HYDROCHLORIDE 10 MG/1
10 TABLET ORAL EVERY 4 HOURS PRN
Status: DISCONTINUED | OUTPATIENT
Start: 2021-02-23 | End: 2021-02-24 | Stop reason: HOSPADM

## 2021-02-23 RX ORDER — ONDANSETRON 4 MG/1
4 TABLET, ORALLY DISINTEGRATING ORAL EVERY 8 HOURS PRN
Status: DISCONTINUED | OUTPATIENT
Start: 2021-02-23 | End: 2021-02-24 | Stop reason: HOSPADM

## 2021-02-23 RX ORDER — ASPIRIN 81 MG/1
81 TABLET ORAL 2 TIMES DAILY
Status: DISCONTINUED | OUTPATIENT
Start: 2021-02-24 | End: 2021-02-24 | Stop reason: HOSPADM

## 2021-02-23 RX ORDER — ONDANSETRON 2 MG/ML
4 INJECTION INTRAMUSCULAR; INTRAVENOUS EVERY 6 HOURS PRN
Status: DISCONTINUED | OUTPATIENT
Start: 2021-02-23 | End: 2021-02-24 | Stop reason: HOSPADM

## 2021-02-23 RX ORDER — CELECOXIB 200 MG/1
200 CAPSULE ORAL ONCE
Status: COMPLETED | OUTPATIENT
Start: 2021-02-23 | End: 2021-02-23

## 2021-02-23 RX ORDER — ROPIVACAINE HYDROCHLORIDE 5 MG/ML
INJECTION, SOLUTION EPIDURAL; INFILTRATION; PERINEURAL
Status: COMPLETED | OUTPATIENT
Start: 2021-02-23 | End: 2021-02-23

## 2021-02-23 RX ORDER — NAPROXEN SODIUM 220 MG
220 TABLET ORAL 2 TIMES DAILY WITH MEALS
Qty: 60 TABLET | Refills: 3
Start: 2021-02-23

## 2021-02-23 RX ORDER — SODIUM CHLORIDE 0.9 % (FLUSH) 0.9 %
10 SYRINGE (ML) INJECTION PRN
Status: DISCONTINUED | OUTPATIENT
Start: 2021-02-23 | End: 2021-02-24 | Stop reason: HOSPADM

## 2021-02-23 RX ORDER — MORPHINE SULFATE 2 MG/ML
2 INJECTION, SOLUTION INTRAMUSCULAR; INTRAVENOUS
Status: DISCONTINUED | OUTPATIENT
Start: 2021-02-23 | End: 2021-02-24 | Stop reason: HOSPADM

## 2021-02-23 RX ORDER — PROPOFOL 10 MG/ML
INJECTION, EMULSION INTRAVENOUS CONTINUOUS PRN
Status: DISCONTINUED | OUTPATIENT
Start: 2021-02-23 | End: 2021-02-23 | Stop reason: SDUPTHER

## 2021-02-23 RX ORDER — OXYCODONE HYDROCHLORIDE AND ACETAMINOPHEN 5; 325 MG/1; MG/1
1 TABLET ORAL PRN
Status: DISCONTINUED | OUTPATIENT
Start: 2021-02-23 | End: 2021-02-23 | Stop reason: HOSPADM

## 2021-02-23 RX ORDER — SODIUM CHLORIDE 9 MG/ML
INJECTION, SOLUTION INTRAVENOUS CONTINUOUS
Status: DISCONTINUED | OUTPATIENT
Start: 2021-02-23 | End: 2021-02-23

## 2021-02-23 RX ORDER — APREPITANT 40 MG/1
40 CAPSULE ORAL ONCE
Status: COMPLETED | OUTPATIENT
Start: 2021-02-23 | End: 2021-02-23

## 2021-02-23 RX ORDER — FENTANYL CITRATE 50 UG/ML
25 INJECTION, SOLUTION INTRAMUSCULAR; INTRAVENOUS EVERY 5 MIN PRN
Status: DISCONTINUED | OUTPATIENT
Start: 2021-02-23 | End: 2021-02-23 | Stop reason: HOSPADM

## 2021-02-23 RX ORDER — INSULIN GLARGINE 100 [IU]/ML
0.25 INJECTION, SOLUTION SUBCUTANEOUS NIGHTLY
Status: DISCONTINUED | OUTPATIENT
Start: 2021-02-23 | End: 2021-02-24

## 2021-02-23 RX ORDER — BUPIVACAINE HYDROCHLORIDE 7.5 MG/ML
INJECTION, SOLUTION INTRASPINAL PRN
Status: DISCONTINUED | OUTPATIENT
Start: 2021-02-23 | End: 2021-02-23 | Stop reason: SDUPTHER

## 2021-02-23 RX ORDER — SODIUM CHLORIDE 450 MG/100ML
INJECTION, SOLUTION INTRAVENOUS CONTINUOUS
Status: DISCONTINUED | OUTPATIENT
Start: 2021-02-23 | End: 2021-02-24 | Stop reason: HOSPADM

## 2021-02-23 RX ORDER — FENTANYL CITRATE 50 UG/ML
INJECTION, SOLUTION INTRAMUSCULAR; INTRAVENOUS PRN
Status: DISCONTINUED | OUTPATIENT
Start: 2021-02-23 | End: 2021-02-23 | Stop reason: SDUPTHER

## 2021-02-23 RX ORDER — MIDAZOLAM HYDROCHLORIDE 1 MG/ML
INJECTION INTRAMUSCULAR; INTRAVENOUS
Status: COMPLETED
Start: 2021-02-23 | End: 2021-02-23

## 2021-02-23 RX ADMIN — PHENYLEPHRINE HYDROCHLORIDE 200 MCG: 10 INJECTION INTRAVENOUS at 11:37

## 2021-02-23 RX ADMIN — PROPOFOL 100 MG: 10 INJECTION, EMULSION INTRAVENOUS at 11:00

## 2021-02-23 RX ADMIN — FENTANYL CITRATE 10 MCG: 50 INJECTION INTRAMUSCULAR; INTRAVENOUS at 10:59

## 2021-02-23 RX ADMIN — OXYCODONE HYDROCHLORIDE 10 MG: 10 TABLET ORAL at 09:05

## 2021-02-23 RX ADMIN — PHENYLEPHRINE HYDROCHLORIDE 100 MCG: 10 INJECTION INTRAVENOUS at 11:09

## 2021-02-23 RX ADMIN — CEFAZOLIN SODIUM 2 G: 10 INJECTION, POWDER, FOR SOLUTION INTRAVENOUS at 10:55

## 2021-02-23 RX ADMIN — Medication 10 MG: at 12:04

## 2021-02-23 RX ADMIN — CELECOXIB 200 MG: 200 CAPSULE ORAL at 09:05

## 2021-02-23 RX ADMIN — PHENYLEPHRINE HYDROCHLORIDE 200 MCG: 10 INJECTION INTRAVENOUS at 11:33

## 2021-02-23 RX ADMIN — ACETAMINOPHEN 650 MG: 325 TABLET ORAL at 18:19

## 2021-02-23 RX ADMIN — APREPITANT 40 MG: 40 CAPSULE ORAL at 09:05

## 2021-02-23 RX ADMIN — CEFAZOLIN SODIUM 2000 MG: 10 INJECTION, POWDER, FOR SOLUTION INTRAVENOUS at 18:21

## 2021-02-23 RX ADMIN — ROPIVACAINE HYDROCHLORIDE 20 ML: 5 INJECTION, SOLUTION EPIDURAL; INFILTRATION; PERINEURAL at 09:56

## 2021-02-23 RX ADMIN — Medication 5 MG: at 11:52

## 2021-02-23 RX ADMIN — OXYCODONE 5 MG: 5 TABLET ORAL at 21:13

## 2021-02-23 RX ADMIN — Medication 10 MG: at 11:41

## 2021-02-23 RX ADMIN — PHENYLEPHRINE HYDROCHLORIDE 200 MCG: 10 INJECTION INTRAVENOUS at 11:20

## 2021-02-23 RX ADMIN — BUPIVACAINE HYDROCHLORIDE IN DEXTROSE 1.2 ML: 7.5 INJECTION, SOLUTION SUBARACHNOID at 10:59

## 2021-02-23 RX ADMIN — MIDAZOLAM 2 MG: 1 INJECTION INTRAMUSCULAR; INTRAVENOUS at 09:56

## 2021-02-23 RX ADMIN — SODIUM CHLORIDE: 4.5 INJECTION, SOLUTION INTRAVENOUS at 16:12

## 2021-02-23 RX ADMIN — PHENYLEPHRINE HYDROCHLORIDE 100 MCG: 10 INJECTION INTRAVENOUS at 11:14

## 2021-02-23 RX ADMIN — VASOPRESSIN 1 UNITS: 20 INJECTION INTRAVENOUS at 12:09

## 2021-02-23 RX ADMIN — VASOPRESSIN 1 UNITS: 20 INJECTION INTRAVENOUS at 12:22

## 2021-02-23 RX ADMIN — DOCUSATE SODIUM 50 MG AND SENNOSIDES 8.6 MG 1 TABLET: 8.6; 5 TABLET, FILM COATED ORAL at 21:13

## 2021-02-23 RX ADMIN — LIDOCAINE HYDROCHLORIDE 60 MG: 20 INJECTION, SOLUTION EPIDURAL; INFILTRATION; INTRACAUDAL; PERINEURAL at 11:00

## 2021-02-23 RX ADMIN — SODIUM CHLORIDE: 9 INJECTION, SOLUTION INTRAVENOUS at 09:05

## 2021-02-23 RX ADMIN — Medication 25 MG: at 11:40

## 2021-02-23 RX ADMIN — PROPOFOL 140 MCG/KG/MIN: 10 INJECTION, EMULSION INTRAVENOUS at 11:00

## 2021-02-23 RX ADMIN — ONDANSETRON 4 MG: 2 INJECTION INTRAMUSCULAR; INTRAVENOUS at 21:13

## 2021-02-23 RX ADMIN — PHENYLEPHRINE HYDROCHLORIDE 200 MCG: 10 INJECTION INTRAVENOUS at 11:25

## 2021-02-23 RX ADMIN — ROPIVACAINE HYDROCHLORIDE 25 ML: 5 INJECTION, SOLUTION EPIDURAL; INFILTRATION; PERINEURAL at 09:57

## 2021-02-23 ASSESSMENT — PAIN SCALES - WONG BAKER: WONGBAKER_NUMERICALRESPONSE: 0

## 2021-02-23 ASSESSMENT — PULMONARY FUNCTION TESTS
PIF_VALUE: 1
PIF_VALUE: 0
PIF_VALUE: 1

## 2021-02-23 ASSESSMENT — PAIN DESCRIPTION - FREQUENCY
FREQUENCY: INTERMITTENT
FREQUENCY: CONTINUOUS

## 2021-02-23 ASSESSMENT — PAIN SCALES - GENERAL
PAINLEVEL_OUTOF10: 0
PAINLEVEL_OUTOF10: 0
PAINLEVEL_OUTOF10: 4
PAINLEVEL_OUTOF10: 0
PAINLEVEL_OUTOF10: 3

## 2021-02-23 ASSESSMENT — PAIN - FUNCTIONAL ASSESSMENT
PAIN_FUNCTIONAL_ASSESSMENT: 0-10
PAIN_FUNCTIONAL_ASSESSMENT: PREVENTS OR INTERFERES SOME ACTIVE ACTIVITIES AND ADLS

## 2021-02-23 ASSESSMENT — PAIN DESCRIPTION - DESCRIPTORS
DESCRIPTORS: ACHING
DESCRIPTORS: ACHING;DISCOMFORT

## 2021-02-23 ASSESSMENT — PAIN DESCRIPTION - ORIENTATION: ORIENTATION: RIGHT

## 2021-02-23 ASSESSMENT — PAIN DESCRIPTION - LOCATION: LOCATION: KNEE

## 2021-02-23 ASSESSMENT — PAIN DESCRIPTION - PAIN TYPE: TYPE: ACUTE PAIN;SURGICAL PAIN

## 2021-02-23 NOTE — H&P
Update History & Physical    The patient's History and Physical of February 18, 2021 was reviewed with the patient and I examined the patient. There was no change. The surgical site was confirmed by the patient and me. Plan: The risks, benefits, expected outcome, and alternative to the recommended procedure have been discussed with the patient. Patient understands and wants to proceed with the procedure.      Electronically signed by Sheri Hess MD on 2/23/2021 at 10:39 AM

## 2021-02-23 NOTE — PLAN OF CARE
Problem: Coping:  Goal: Ability to cope will improve  Description: Ability to cope will improve  Outcome: Ongoing  Note: Patient will verbalize appropriate coping skills. Problem: Discharge Planning:  Goal: Discharged to appropriate level of care  Description: Discharged to appropriate level of care  Outcome: Ongoing  Note: Working with patient, physician, and social work to discharge patient to the appropriate level of care. Problem: Mobility - Impaired:  Goal: Mobility will improve  Description: Mobility will improve  Outcome: Ongoing  Note: Patient will describe an ease of ambulation this shift. Problem: Infection - Surgical Site:  Goal: Will show no infection signs and symptoms  Description: Will show no infection signs and symptoms  Outcome: Ongoing  Note: Patient is alert and oriented, afebrile, has manageable complaints of pain, skin is intact and appropriate for ethnicity in color       Problem: Pain - Acute:  Goal: Pain level will decrease  Description: Pain level will decrease  Outcome: Ongoing  Note: Pain /discomfort being managed with PRN analgesics per MD orders. Patient able to express presence and absence of pain and rate pain appropriately using numerical scale. Problem: Falls - Risk of:  Goal: Will remain free from falls  Description: Will remain free from falls  Outcome: Ongoing  Note: Fall risk assessment completed . Fall precautions in place, bed alarm on, side rails 2/4 up, call light in reach, educated pt on calling for assistance when needed, room clear of clutter. Pt verbalized understanding. Goal: Absence of physical injury  Description: Absence of physical injury  Outcome: Ongoing     Problem: Pain:  Goal: Pain level will decrease  Description: Pain level will decrease  Outcome: Ongoing  Note: Pain /discomfort being managed with PRN analgesics per MD orders. Patient able to express presence and absence of pain and rate pain appropriately using numerical scale. Goal: Control of acute pain  Description: Control of acute pain  Outcome: Ongoing  Goal: Control of chronic pain  Description: Control of chronic pain  Outcome: Ongoing

## 2021-02-23 NOTE — PROGRESS NOTES
Pt arrived to 3118 from PACU. A&Ox4. Admission and assessment complete. IV remains in place and patent. Left leg in ace wrap with ice applied. Pedal pulses palpable, with mod push/pulls. Denies numbness or tingling. C/o left leg pain, but is tolerable to patient and not needing pain medication at this time. No needs voiced. Fall precautions in place. Call light within reach. Will continue to monitor.     Electronically signed by Lucinda Fields RN on 2/23/2021 at 5:34 PM

## 2021-02-23 NOTE — CARE COORDINATION
2/23 CM  spoke with patient and confirmed JET Class plan to return home with [facility] and the support of her  . Sw made referral to [facility] with [facility] . Will also need patrizia perkins/ Basil notified.   Electronically signed by Sharyle Glaze on 2/23/2021 at 2:37 PM

## 2021-02-23 NOTE — ANESTHESIA PROCEDURE NOTES
Peripheral Block    Patient location during procedure: pre-op  Start time: 2/23/2021 9:57 AM  End time: 2/23/2021 9:57 AM  Staffing  Performed: anesthesiologist   Anesthesiologist: Kalli Harman MD  Preanesthetic Checklist  Completed: patient identified, IV checked, site marked, risks and benefits discussed, surgical consent, monitors and equipment checked, pre-op evaluation, timeout performed, anesthesia consent given, oxygen available and patient being monitored  Peripheral Block  Patient position: supine  Prep: ChloraPrep  Patient monitoring: continuous pulse ox and IV access  Block type: Saphenous  Laterality: left  Injection technique: single-shot  Guidance: ultrasound guided  Provider prep: mask  Assessment  Injection assessment: negative aspiration for heme, no paresthesia on injection and local visualized surrounding nerve on ultrasound  Paresthesia pain: none  Slow fractionated injection: yes  Hemodynamics: stable  Additional Notes  placement of 25 ml of 0.5% Ropivacaine deep to the left sartorius muscle superolateral to the femoral artery under dynamic ultrasound guidance with fractionated injection. No complications. No blood loss.    Medications Administered  Ropivacaine (NAROPIN) injection 0.5%, 25 mL  Reason for block: post-op pain management and at surgeon's request Telephone Encounter by Alison Rhodes RN at 03/05/18 04:04 PM     Author:  Alison Rhodes RN Service:  (none) Author Type:  Registered Nurse     Filed:  03/05/18 04:06 PM Encounter Date:  3/5/2018 Status:  Signed     :  Alison Rhodes RN (Registered Nurse)            Patient seen 3/1/18.  Counseling confirmed in [employer] and mother notified of this (release on file).[SM1.1M]  Electronically Signed by:    Alison Rhodes RN , 3/5/2018[SM1.2T]        Revision History        User Key Date/Time User Provider Type Action    > SM1.2 03/05/18 04:06 PM Alison Rhodes, RN Registered Nurse Sign     SM1.1 03/05/18 04:04 PM Alison Rhodes, RN Registered Nurse     M - Manual, T - Template

## 2021-02-23 NOTE — ANESTHESIA PROCEDURE NOTES
Peripheral Block    Patient location during procedure: pre-op  Start time: 2/23/2021 9:56 AM  End time: 2/23/2021 9:56 AM  Staffing  Performed: anesthesiologist   Anesthesiologist: Elizabeth Vasquez MD  Preanesthetic Checklist  Completed: patient identified, IV checked, site marked, risks and benefits discussed, surgical consent, monitors and equipment checked, pre-op evaluation, timeout performed, anesthesia consent given, oxygen available and patient being monitored  Peripheral Block  Patient position: supine  Prep: ChloraPrep  Patient monitoring: continuous pulse ox and IV access  Block type: iPacks  Laterality: left  Injection technique: single-shot  Guidance: ultrasound guided  Local infiltration: ropivacaine  Provider prep: mask  Local infiltration: ropivacaine  Assessment  Injection assessment: negative aspiration for heme, no paresthesia on injection and local visualized surrounding nerve on ultrasound  Paresthesia pain: none  Slow fractionated injection: yes  Hemodynamics: stable  Additional Notes  Midazolam 2 mg administered for placement of 20 ml of 0.5% Ropivacaine anterior to the left popliteal artery in the knee capsule under dynamic ultrasound guidance with fractionated injection. No complications. No blood loss.    Medications Administered  Ropivacaine (NAROPIN) injection 0.5%, 20 mL  Reason for block: post-op pain management and at surgeon's request

## 2021-02-23 NOTE — PROGRESS NOTES
Checking on patient Q2H for nutrition needs, hygiene needs, comfort measures, mobility, fall risk interventions, and safe environment. All precautions and interventions in place. Educated patient on use of call light and telephone. Patient verbalizes understanding. Call light/telephone in reach.   Electronically signed by Wanda Smith RN on 2/23/2021 at 2:46 PM

## 2021-02-23 NOTE — PROGRESS NOTES
To pacu from OR. Pt asleep with oral airway in place. Dressing to left knee dry and intact. Ice cuff on and in use. Pedal pulses palpable. IV infusing. Monitor in sinus rhythm.

## 2021-02-23 NOTE — CARE COORDINATION
Hillary/Ally received referral from  for [facility]. Will need PT notes and DME Orders. Will verify patient's insurance and follow up with patient to deliver the ordered item(s) prior to discharge.     Thank you for the referral.  Electronically signed by Chris Castro on 2/23/2021 at 4:10 PM  Cell ph# [phone]

## 2021-02-23 NOTE — ANESTHESIA PROCEDURE NOTES
Spinal Block    Start time: 2/23/2021 10:51 AM  End time: 2/23/2021 10:59 AM  Reason for block: primary anesthetic  Staffing  Performed: resident/CRNA   Anesthesiologist: Marvin Douglas MD  Resident/CRNA: Blake Umanzor APRN - DIETER  Other anesthesia staff: Ender Morales RN  Preanesthetic Checklist  Completed: patient identified, IV checked, site marked, risks and benefits discussed, surgical consent, monitors and equipment checked, pre-op evaluation, timeout performed, anesthesia consent given, oxygen available and patient being monitored  Spinal Block  Patient position: sitting  Prep: ChloraPrep and site prepped and draped  Patient monitoring: continuous pulse ox and frequent blood pressure checks  Approach: midline  Location: L4/L5  Provider prep: sterile gloves and mask  Local infiltration: lidocaine  Dose: 0.2  Agent: bupivacaine  Adjuvant: fentanyl  Dose: 1.2  Dose: 1.2  Needle  Needle type: Pencan   Needle gauge: 25 G  Needle length: 4 in  Lot number: [id number]  Expiration date: 11/30/2021  Assessment  Sensory level: T6  Swirl obtained: Yes  CSF: clear  Attempts: 1  Hemodynamics: stable

## 2021-02-23 NOTE — ANESTHESIA PRE PROCEDURE
 fluocinonide (LIDEX) 0.05 % cream Apply 1 mg topically 2 times daily      ibuprofen (ADVIL;MOTRIN) 200 MG CAPS Take 200 mg by mouth      naproxen sodium (ANAPROX) 220 MG tablet Take 220 mg by mouth 2 times daily (with meals)      omeprazole (PRILOSEC) 20 MG delayed release capsule Take 20 mg by mouth as needed       raNITIdine (ZANTAC) 75 MG tablet Take 75 mg by mouth 2 times daily       No current facility-administered medications for this encounter. Vital Signs (Current)   Vitals:    02/17/21 1636   Weight: 180 lb (81.6 kg)   Height: 5' 6\" (1.676 m)                                          BP Readings from Last 3 Encounters:   02/18/21 122/77     Vital Signs Statistics (for past 48 hrs)     No data recorded  BP Readings from Last 3 Encounters:   02/18/21 122/77       BMI  Body mass index is 29.05 kg/m². Estimated body mass index is 29.05 kg/m² as calculated from the following:    Height as of this encounter: 5' 6\" (1.676 m). Weight as of this encounter: 180 lb (81.6 kg). CBC   Lab Results   Component Value Date    WBC 9.1 02/15/2021    RBC 4.64 02/15/2021    HGB 14.6 02/15/2021    HCT 42.7 02/15/2021    MCV 92.0 02/15/2021    RDW 14.0 02/15/2021     02/15/2021     CMP    Lab Results   Component Value Date     02/15/2021    K 4.2 02/15/2021     02/15/2021    CO2 27 02/15/2021    BUN 15 02/15/2021    CREATININE 0.7 02/15/2021    GFRAA >60 02/15/2021    LABGLOM >60 02/15/2021    GLUCOSE 80 02/15/2021    CALCIUM 10.1 02/15/2021     BMP    Lab Results   Component Value Date     02/15/2021    K 4.2 02/15/2021     02/15/2021    CO2 27 02/15/2021    BUN 15 02/15/2021    CREATININE 0.7 02/15/2021    CALCIUM 10.1 02/15/2021    GFRAA >60 02/15/2021    LABGLOM >60 02/15/2021    GLUCOSE 80 02/15/2021     POCGlucose  No results for input(s): GLUCOSE in the last 72 hours.    [facility]    Lab Results   Component Value Date    PROTIME 12.1 02/15/2021    INR 1.04 02/15/2021 APTT 34.0 97/95/4904     HCG (If Applicable) No results found for: PREGTESTUR, PREGSERUM, HCG, HCGQUANT   ABGs No results found for: PHART, PO2ART, EDI8GUQ, SJF8SDH, BEART, F1MPEMEF   Type & Screen (If Applicable)  No results found for: LABABO, LABRH                         BMI: Wt Readings from Last 3 Encounters:       NPO Status:                          Anesthesia Evaluation  Patient summary reviewed   history of anesthetic complications: PONV. Airway: Mallampati: II        Dental:          Pulmonary:       (-) pneumonia                           Cardiovascular:Negative CV ROS                      Neuro/Psych:      (-) seizures and CVA           GI/Hepatic/Renal:   (+) GERD:,      (-) bowel prep and no morbid obesity       Endo/Other:    (+) : arthritis: OA., malignancy/cancer. (-) diabetes mellitus               Abdominal:           Vascular: negative vascular ROS. Anesthesia Plan      regional, spinal and MAC     ASA 3       Induction: intravenous. MIPS: Prophylactic antiemetics administered. Anesthetic plan and risks discussed with patient. Plan discussed with CRNA. Attending anesthesiologist reviewed and agrees with Pre Eval content              This pre-anesthesia assessment may be used as a history and physical.    DOS STAFF ADDENDUM:    Pt seen and examined, chart reviewed (including anesthesia, drug and allergy history). No interval changes to history and physical examination. Anesthetic plan, risks, benefits, alternatives, and personnel involved discussed with patient. Patient verbalized an understanding and agrees to proceed.       Cuba Wellington MD  February 23, 2021  8:23 AM

## 2021-02-23 NOTE — PROGRESS NOTES
[facility] Orthopedic Surgery   Progress Note      S/P :  SUBJECTIVE  In bed. Alert and oriented. Pain is   described in left knee and with the intensity of moderate. Pain is described as aching. OBJECTIVE              Physical                      VITALS:  /72   Pulse 56   Temp 97.4 °F (36.3 °C) (Axillary)   Resp 17   Ht 5' 6\" (1.676 m)   Wt 178 lb 9.2 oz (81 kg)   SpO2 99%   BMI 28.82 kg/m²                     MUSCULOSKELETAL:  left foot NVI. Wiggles toes to command. Pedal pulses are palpable. NEUROLOGIC:                                  Sensory:  Touch:  Left Lower Extremity:  normal                                                 Surgical wound appears clean and dry left knee with ACE and ice pad.      Data       CBC:   Lab Results   Component Value Date    WBC 9.1 02/15/2021    RBC 4.64 02/15/2021    HGB 14.6 02/15/2021    HCT 42.7 02/15/2021    MCV 92.0 02/15/2021    MCH 31.5 02/15/2021    MCHC 34.3 02/15/2021    RDW 14.0 02/15/2021     02/15/2021    MPV 8.2 02/15/2021        WBC:    Lab Results   Component Value Date    WBC 9.1 02/15/2021        Hemoglobin/Hematocrit:    Lab Results   Component Value Date    HGB 14.6 02/15/2021    HCT 42.7 02/15/2021        PT/INR:    Lab Results   Component Value Date    PROTIME 12.1 02/15/2021    INR 1.04 02/15/2021              Current Inpatient Medications             Current Facility-Administered Medications: insulin glargine (LANTUS) injection vial 20 Units, 0.25 Units/kg, Subcutaneous, Nightly  insulin lispro (HUMALOG) injection vial 6 Units, 0.08 Units/kg, Subcutaneous, TID WC  insulin lispro (HUMALOG) injection vial 0-6 Units, 0-6 Units, Subcutaneous, TID WC  insulin lispro (HUMALOG) injection vial 0-3 Units, 0-3 Units, Subcutaneous, Nightly  glucose (GLUTOSE) 40 % oral gel 15 g, 15 g, Oral, PRN  dextrose 50 % IV solution, 12.5 g, Intravenous, PRN  glucagon (rDNA) injection 1 mg, 1 mg, Intramuscular, PRN dextrose 5 % solution, 100 mL/hr, Intravenous, PRN  0.45 % sodium chloride infusion, , Intravenous, Continuous  sodium chloride flush 0.9 % injection 10 mL, 10 mL, Intravenous, 2 times per day  sodium chloride flush 0.9 % injection 10 mL, 10 mL, Intravenous, PRN  acetaminophen (TYLENOL) tablet 650 mg, 650 mg, Oral, Q6H  oxyCODONE (ROXICODONE) immediate release tablet 5 mg, 5 mg, Oral, Q4H PRN **OR** oxyCODONE HCl (OXY-IR) immediate release tablet 10 mg, 10 mg, Oral, Q4H PRN  morphine (PF) injection 2 mg, 2 mg, Intravenous, Q3H PRN **OR** morphine (PF) injection 4 mg, 4 mg, Intravenous, Q3H PRN  ceFAZolin (ANCEF) 2000 mg in dextrose 5 % 100 mL IVPB, 2,000 mg, Intravenous, Q8H  ondansetron (ZOFRAN-ODT) disintegrating tablet 4 mg, 4 mg, Oral, Q8H PRN **OR** ondansetron (ZOFRAN) injection 4 mg, 4 mg, Intravenous, Q6H PRN  sennosides-docusate sodium (SENOKOT-S) 8.6-50 MG tablet 1 tablet, 1 tablet, Oral, BID  magnesium hydroxide (MILK OF MAGNESIA) 400 MG/5ML suspension 30 mL, 30 mL, Oral, Daily PRN  [START ON 2/24/2021] aspirin EC tablet 81 mg, 81 mg, Oral, BID    ASSESSMENT AND PLAN      Post left TKA, stable exam  DVT prophylaxis ordered, ASA 81mg twice at day for 14 days for DVT prophylaxis  PT OT for ADL's and ambulation as tolerated  SS for DC planning, Home with home care tomorrow  IV or PO pain med as ordered    Nicole Proctor  2/23/2021  3:02 PM

## 2021-02-23 NOTE — OP NOTE
Patient: Arik Holland  YOB: 1955  MRN: [id number]    DATE OF PROCEDURE: 2/23/2021      PREOPERATIVE DIAGNOSIS:  Tricompartmental degenerative osteoarthritis of the left knee. POSTOPERATIVE DIAGNOSIS:  Tricompartmental degenerative osteoarthritis of the left knee. OPERATION PERFORMED:  Robotic-assisted left total knee arthroplasty. SURGEON:  Rosita Sanders MD     ASSISTANT:  ARLIN Fowler    EBL: 100 mL     IMPLANTS:  Adelanto Triathlon CR cementless femur size 5  Noble Triathlon cementless tibia size 5  11mm CS polyethylene  35mm cementless asymmetric patella     INDICATIONS:  The patient is a 77 y.o. female who presents for left knee replacement. She had been treated conservatively with anti-inflammatories, physical therapy and intra-articular cortisone injections; none of these gave any significant relief. She is now here for total knee replacement. OPERATIVE PROCEDURE:  The patient was brought to the operating room. Once anesthetic was obtained and intravenous antibiotics delivered, her knee was prepped and draped in a sterile fashion. The leg was exsanguinated. Tourniquet was placed to 250 mmHg around the thigh of the operative leg. An anterior midline incision was made over the knee. Skin and subcutaneous tissue were divided down to the extensor mechanism. A subvastus arthrotomy was performed. The knee was fully exposed and then two distal femoral pins and two proximal tibial pins were placed with robotic aerials. Using a third robotic aerial, the knee was registered with the robot. The implant was virtually manipulated to balance the flexion and extension gaps. Once this was done, the robotic cutting arm was brought in and the tibial cut was made. The knee was then re-balanced with a tensioner based off of this tibial cut. The remaining cuts were then performed in the order of posterior femoral condyles, anterior femoral condylar cut, anterior femoral chamfer cuts, posterior femoral chamfer cuts, and then distal femoral condylar cuts. All bony fragments were removed. The knee was reconstructed to accept a #5 tibial baseplate, a #5 femoral cruciate-retaining femur, and a 11-mm polyethylene liner. The knee came to full extension, excellent flexion, and good overall stability. The patella tracked within the trochlea of the femur. All trial implants were then removed. The ends of the bones were irrigated. The #5 tibial tray and the #5 Noble Triathlon cruciate-retaining femoral component were then placed. A trial reduction with the 11-mm poly was performed. Then the real 11-mm polyethylene liner was placed. The knee came to full extension, excellent flexion, and good overall stability. The patella was flipped, measured, and cut to accept a 35 mm asymmetric patella. Once this was done then, the 35-mm asymmetric cementless patella was placed. The patella tracked well. The wound was irrigated out. Hemostasis was obtained. The wound was injected with Exparel. It was also bathed with aqueous iodine. The wound then was closed in layers. The patient tolerated the procedure well. A dressing was applied, and she was brought to the recovery room in good condition.     Roxane Jimenez MD  2/23/2021

## 2021-02-23 NOTE — PROGRESS NOTES
Physical Therapy    Facility/Department: [address] ORTHOPEDICS    Initial Assessment  This note serves as patient discharge summary if pt discharges prior to next PT visit      NAME: Tesha Good  : 1955  MRN: [id number]    Date of Service: 2021    Discharge Recommendations:  24 hour supervision or assist, Home with assist PRN   Tesha Good scored a 18/24 on the AM-PAC short mobility form. Current research shows that an AM-PAC score of 18 or greater is typically associated with a discharge to the patient's home setting. Based on the patient's AM-PAC score and their current functional mobility deficits, it is recommended that the patient have 2-3 sessions per week of Physical Therapy at d/c to increase the patient's independence. At this time, this patient demonstrates the endurance and safety to discharge home with home PT services and a follow up treatment frequency of 2-3x/wk. Please see assessment section for further patient specific details. PT Equipment Recommendations  Equipment Needed: Yes  Mobility Devices: Hussain Page: Rolling    Assessment   Body structures, Functions, Activity limitations: Decreased functional mobility ; Decreased ROM; Decreased balance  Assessment: Prior to elective R TKR robot assisted via Dr. Nish Sumner 2021, patient lived in ranch home with spouse, and was independent in all functional mobility, including driving and gait without device. Status 2021: SBA bed mobility, Transfers CGA and cues. RW ambulation 21' CGA-SBA with good tolerance and quality. Anticipate patient will be appropriate for DC to home with family support and home PT level 1. She requires a rolling walker to promote safe and quality ambulation.   Treatment Diagnosis: Impaired functional mobility  Prognosis: Good  Decision Making: Low Complexity  History: as noted  Clinical Presentation: Stable PT Education: Goals;PT Role;Plan of Care;Transfer Training;General Safety;Gait Training;Disease Specific Education;Weight-bearing Education  Patient Education: care and use of cryocuff. Request for  to be present for therapies 2-. REQUIRES PT FOLLOW UP: Yes  Activity Tolerance  Activity Tolerance: Patient Tolerated treatment well       Patient Diagnosis(es): The primary encounter diagnosis was Osteoarthritis of knee, unspecified. A diagnosis of Arthritis of left knee was also pertinent to this visit. has a past medical history of Arthritis, Arthropathy, Endometrial cancer ([address] GERD (gastroesophageal reflux disease), and PONV (postoperative nausea and vomiting). has a past surgical history that includes Hysterectomy; Colonoscopy (07/01/2020); and other surgical history. Restrictions  Restrictions/Precautions  Restrictions/Precautions: Weight Bearing, Fall Risk  Lower Extremity Weight Bearing Restrictions  Left Lower Extremity Weight Bearing: Weight Bearing As Tolerated     Vision/Hearing  Vision: Impaired  Vision Exceptions: Wears glasses for reading  Hearing: Within functional limits       Subjective  General  Chart Reviewed: Yes  Additional Pertinent Hx: 77 female to hospital 2- for elective L TKR robot assisted, via Dr. Rodney Platt. Response To Previous Treatment: Not applicable  Family / Caregiver Present: No  Referring Practitioner: Angélica Dennis MD  Referral Date : 02/23/21  Subjective  Subjective: Patient in bed. Agreeable to therapy.  Rates L knee pain 3/10 at rest.  Pain Screening  Patient Currently in Pain: Yes       Orientation  Orientation  Overall Orientation Status: Within Normal Limits(trace distractible)     Social/Functional History  Social/Functional History  Lives With: Spouse  Type of Home: House  Home Layout: One level(including laundry)  Home Access: Stairs to enter with rails  Entrance Stairs - Number of Steps: 2  Entrance Stairs - Rails: Left Bathroom Shower/Tub: Walk-in shower  Bathroom Toilet: Standard  Bathroom Equipment: Built-in shower seat, Toilet raiser  Bathroom Accessibility: Walker accessible  ADL Assistance: Independent  Homemaking Assistance: Independent  Ambulation Assistance: Independent  Transfer Assistance: Independent  Active : Yes     Cognition   Cognition  Overall Cognitive Status: [facility]    Objective  AROM RLE (degrees)  RLE AROM: WFL  AROM LLE (degrees)  LLE AROM : WFL  LLE General AROM: except knee 15-60  Strength RLE  Strength RLE: WFL  Strength LLE  Comment: functionally 3+/5  Sensation  Overall Sensation Status: WFL  Bed mobility  Supine to Sit: Stand by assistance(HOB up. No rail.)  Sit to Supine: Unable to assess(In BS chair at end of session)  Transfers  Sit to Stand: Contact guard assistance(cues. EOB and commode)  Stand to sit: Contact guard assistance(and cues for technique. Commode and BS chair.)  Ambulation  Ambulation?: Yes  Ambulation 1  Surface: level tile  Device: Rolling Walker  Assistance: Contact guard assistance;Stand by assistance  Quality of Gait: Interrupted, partial step through pattern. Cues to stay within wh walker base. L knee in ~20 degrees flexion throughout. Stable @ RW. Distance: 10', 21'  Comments: Patient sits on commode, but does not urinate. Therapist threads brief to knees. Patient close SBA as she stands and raises brief. Balance  Posture: Good  Sitting - Static: Good  Sitting - Dynamic: Good  Standing - Static: Good(@ RW)  Standing - Dynamic: Good(At RW)  Exercises  Ankle Pumps: x 10     Plan   Plan  Times per week: 1-4 visits as needed  Current Treatment Recommendations: Functional Mobility Training, Transfer Training, Gait Training, Stair training, Home Exercise Program, Patient/Caregiver Education & Training, Equipment Evaluation, Education, & procurement  Safety Devices  Type of devices: (Therapist refills cryocuff and attaches to patient.  Set up with dinner.)    Goals  Short term goals Time Frame for Short term goals: By Acute DC  Short term goal 1: Transfers SBA  Short term goal 2: Gait RW 50' SBA  Short term goal 3: 4 stairs L rail, CGA and cues  Short term goal 4: Tolerates 5-10 reps initial TKR HEP exs  Patient Goals   Patient goals : \"To walk without a limp. To be able to walk my stairs one foot after the other. \"       Therapy Time   Individual Concurrent Group Co-treatment   Time In 4299         Time Out 1750         Minutes 68            Paula Reagan PT  Electronically signed by Geetha Orourke, [address] [place] [address] Drive (#[phone])  on 2/23/2021 at 6:16 PM

## 2021-02-23 NOTE — PROGRESS NOTES
Portable x rays done per order. Pt more awake. TERRELL to command. States numbness and tingling BLE post spinal anesthesia.

## 2021-02-24 VITALS
HEART RATE: 74 BPM | WEIGHT: 187.61 LBS | DIASTOLIC BLOOD PRESSURE: 71 MMHG | SYSTOLIC BLOOD PRESSURE: 111 MMHG | HEIGHT: 66 IN | RESPIRATION RATE: 17 BRPM | BODY MASS INDEX: 30.15 KG/M2 | TEMPERATURE: 97.4 F | OXYGEN SATURATION: 97 %

## 2021-02-24 LAB
ANION GAP SERPL CALCULATED.3IONS-SCNC: 7 MMOL/L (ref 3–16)
BUN BLDV-MCNC: 11 MG/DL (ref 7–20)
CALCIUM SERPL-MCNC: 8.3 MG/DL (ref 8.3–10.6)
CHLORIDE BLD-SCNC: 101 MMOL/L (ref 99–110)
CO2: 24 MMOL/L (ref 21–32)
CREAT SERPL-MCNC: 0.7 MG/DL (ref 0.6–1.2)
GFR AFRICAN AMERICAN: >60
GFR NON-AFRICAN AMERICAN: >60
GLUCOSE BLD-MCNC: 128 MG/DL (ref 70–99)
GLUCOSE BLD-MCNC: 131 MG/DL (ref 70–99)
HCT VFR BLD CALC: 33.5 % (ref 36–48)
HEMOGLOBIN: 11.5 G/DL (ref 12–16)
MCH RBC QN AUTO: 32 PG (ref 26–34)
MCHC RBC AUTO-ENTMCNC: 34.4 G/DL (ref 31–36)
MCV RBC AUTO: 92.8 FL (ref 80–100)
PDW BLD-RTO: 14 % (ref 12.4–15.4)
PERFORMED ON: ABNORMAL
PLATELET # BLD: 160 K/UL (ref 135–450)
PMV BLD AUTO: 8 FL (ref 5–10.5)
POTASSIUM SERPL-SCNC: 4.2 MMOL/L (ref 3.5–5.1)
RBC # BLD: 3.61 M/UL (ref 4–5.2)
SODIUM BLD-SCNC: 132 MMOL/L (ref 136–145)
WBC # BLD: 10.5 K/UL (ref 4–11)

## 2021-02-24 PROCEDURE — 80048 BASIC METABOLIC PNL TOTAL CA: CPT

## 2021-02-24 PROCEDURE — 97110 THERAPEUTIC EXERCISES: CPT

## 2021-02-24 PROCEDURE — 36415 COLL VENOUS BLD VENIPUNCTURE: CPT

## 2021-02-24 PROCEDURE — 6360000002 HC RX W HCPCS: Performed by: ORTHOPAEDIC SURGERY

## 2021-02-24 PROCEDURE — 97530 THERAPEUTIC ACTIVITIES: CPT

## 2021-02-24 PROCEDURE — 6370000000 HC RX 637 (ALT 250 FOR IP): Performed by: ORTHOPAEDIC SURGERY

## 2021-02-24 PROCEDURE — 97535 SELF CARE MNGMENT TRAINING: CPT

## 2021-02-24 PROCEDURE — 85027 COMPLETE CBC AUTOMATED: CPT

## 2021-02-24 PROCEDURE — 97166 OT EVAL MOD COMPLEX 45 MIN: CPT

## 2021-02-24 PROCEDURE — 6370000000 HC RX 637 (ALT 250 FOR IP): Performed by: NURSE PRACTITIONER

## 2021-02-24 PROCEDURE — 94761 N-INVAS EAR/PLS OXIMETRY MLT: CPT

## 2021-02-24 PROCEDURE — 97116 GAIT TRAINING THERAPY: CPT

## 2021-02-24 PROCEDURE — 2580000003 HC RX 258: Performed by: ORTHOPAEDIC SURGERY

## 2021-02-24 RX ORDER — SCOLOPAMINE TRANSDERMAL SYSTEM 1 MG/1
1 PATCH, EXTENDED RELEASE TRANSDERMAL
Qty: 2 PATCH | Refills: 1 | Status: SHIPPED | OUTPATIENT
Start: 2021-02-24

## 2021-02-24 RX ORDER — SCOLOPAMINE TRANSDERMAL SYSTEM 1 MG/1
1 PATCH, EXTENDED RELEASE TRANSDERMAL
Status: DISCONTINUED | OUTPATIENT
Start: 2021-02-24 | End: 2021-02-24 | Stop reason: HOSPADM

## 2021-02-24 RX ADMIN — DOCUSATE SODIUM 50 MG AND SENNOSIDES 8.6 MG 1 TABLET: 8.6; 5 TABLET, FILM COATED ORAL at 08:23

## 2021-02-24 RX ADMIN — ONDANSETRON 4 MG: 2 INJECTION INTRAMUSCULAR; INTRAVENOUS at 08:25

## 2021-02-24 RX ADMIN — CEFAZOLIN SODIUM 2000 MG: 10 INJECTION, POWDER, FOR SOLUTION INTRAVENOUS at 02:45

## 2021-02-24 RX ADMIN — Medication 10 ML: at 08:28

## 2021-02-24 RX ADMIN — ACETAMINOPHEN 650 MG: 325 TABLET ORAL at 00:19

## 2021-02-24 RX ADMIN — ASPIRIN 81 MG: 81 TABLET, FILM COATED ORAL at 08:22

## 2021-02-24 RX ADMIN — OXYCODONE 5 MG: 5 TABLET ORAL at 02:45

## 2021-02-24 RX ADMIN — ACETAMINOPHEN 650 MG: 325 TABLET ORAL at 05:30

## 2021-02-24 RX ADMIN — ACETAMINOPHEN 650 MG: 325 TABLET ORAL at 14:18

## 2021-02-24 RX ADMIN — OXYCODONE 5 MG: 5 TABLET ORAL at 14:18

## 2021-02-24 RX ADMIN — OXYCODONE 5 MG: 5 TABLET ORAL at 08:23

## 2021-02-24 ASSESSMENT — PAIN DESCRIPTION - DESCRIPTORS
DESCRIPTORS: ACHING;DISCOMFORT

## 2021-02-24 ASSESSMENT — PAIN DESCRIPTION - PROGRESSION
CLINICAL_PROGRESSION: GRADUALLY IMPROVING
CLINICAL_PROGRESSION: GRADUALLY WORSENING
CLINICAL_PROGRESSION: GRADUALLY IMPROVING
CLINICAL_PROGRESSION: NOT CHANGED

## 2021-02-24 ASSESSMENT — PAIN - FUNCTIONAL ASSESSMENT: PAIN_FUNCTIONAL_ASSESSMENT: PREVENTS OR INTERFERES SOME ACTIVE ACTIVITIES AND ADLS

## 2021-02-24 ASSESSMENT — PAIN SCALES - WONG BAKER: WONGBAKER_NUMERICALRESPONSE: 0

## 2021-02-24 ASSESSMENT — PAIN DESCRIPTION - ONSET
ONSET: ON-GOING

## 2021-02-24 ASSESSMENT — PAIN SCALES - GENERAL
PAINLEVEL_OUTOF10: 4
PAINLEVEL_OUTOF10: 1
PAINLEVEL_OUTOF10: 1
PAINLEVEL_OUTOF10: 0
PAINLEVEL_OUTOF10: 4
PAINLEVEL_OUTOF10: 1
PAINLEVEL_OUTOF10: 5

## 2021-02-24 ASSESSMENT — PAIN DESCRIPTION - FREQUENCY
FREQUENCY: CONTINUOUS
FREQUENCY: CONTINUOUS

## 2021-02-24 ASSESSMENT — PAIN DESCRIPTION - LOCATION
LOCATION: KNEE

## 2021-02-24 ASSESSMENT — PAIN DESCRIPTION - ORIENTATION
ORIENTATION: LEFT
ORIENTATION: RIGHT

## 2021-02-24 ASSESSMENT — PAIN DESCRIPTION - PAIN TYPE
TYPE: ACUTE PAIN;SURGICAL PAIN
TYPE: SURGICAL PAIN

## 2021-02-24 NOTE — PROGRESS NOTES
Occupational Therapy   Occupational Therapy Initial Assessment  Should patient be discharged prior to another treatment session, this note shall serve as the discharge summary. Date: 2021   Patient Name: Alesha Landa  MRN: [id number]     : 1955    Date of Service: 2021    Discharge Recommendations:  Patient would benefit from continued therapy after discharge, Home with Home health OT, Home with assist PRN  OT Equipment Recommendations  Equipment Needed: No    Assessment   Performance deficits / Impairments: Decreased functional mobility ; Decreased endurance;Decreased ADL status; Decreased balance;Decreased strength;Decreased high-level IADLs  Assessment: Pt presents for elective TKR. Pt was independent prior to admission. Pt currently with CG/SBA with functional transfers and SBA with LB dressing. Pt safe for d/c home with , recommend cont OT to increase independence with self care and functional mobility. Prognosis: Good  Decision Making: Medium Complexity  History: see above  Exam: mobility, self care  Assistance / Modification: assist of 1, RW  OT Education: OT Role;Plan of Care;ADL Adaptive Strategies;Transfer Training;Equipment  Barriers to Learning: none  REQUIRES OT FOLLOW UP: Yes  Activity Tolerance  Activity Tolerance: Patient Tolerated treatment well  Safety Devices  Safety Devices in place: Yes  Type of devices: Left in chair;Call light within reach;Nurse notified; Chair alarm in place           Patient Diagnosis(es): The primary encounter diagnosis was Osteoarthritis of knee, unspecified. A diagnosis of Arthritis of left knee was also pertinent to this visit. has a past medical history of Arthritis, Arthropathy, Endometrial cancer ([facility] [address] GERD (gastroesophageal reflux disease), and PONV (postoperative nausea and vomiting). has a past surgical history that includes Hysterectomy; Colonoscopy (2020); and other surgical history.            Restrictions Restrictions/Precautions  Restrictions/Precautions: Weight Bearing, Fall Risk  Lower Extremity Weight Bearing Restrictions  Left Lower Extremity Weight Bearing: Weight Bearing As Tolerated    Subjective   General  Chart Reviewed: Yes, Progress Notes, History and Physical, Orders  Additional Pertinent Hx: elective L TKR  Family / Caregiver Present: No  Referring Practitioner: Dr. KUHN [facility]  Diagnosis: TKR- L  Subjective  Subjective: Pt was nauseous earlier but has improved. Complained of pain in knee but did not rate.   Agreed to OT  Pain Assessment  Pain Level: 5  Social/Functional History  Social/Functional History  Lives With: Spouse  Type of Home: House  Home Layout: One level(including laundry)  Home Access: Stairs to enter with rails  Entrance Stairs - Number of Steps: 2  Entrance Stairs - Rails: Left  Bathroom Shower/Tub: Walk-in shower  Bathroom Toilet: Standard  Bathroom Equipment: Built-in shower seat, Toilet raiser  Bathroom Accessibility: Walker accessible  ADL Assistance: Independent  Homemaking Assistance: Independent  Ambulation Assistance: Independent  Transfer Assistance: Independent  Active : Yes       Objective   Vision Exceptions: Wears glasses for reading  Hearing: Within functional limits    Orientation  Overall Orientation Status: Within Functional Limits     Balance  Sitting Balance: Stand by assistance  Standing Balance: Stand by assistance  Functional Mobility  Functional - Mobility Device: Rolling Walker  Activity: Other  Assist Level: Stand by assistance  Functional Mobility Comments: Took a few steps to bedside recliner without LOB, RW used with good safety  ADL  UE Dressing: Stand by assistance  LE Dressing: Stand by assistance  Additional Comments: Dressed in street clothes with SBA        Bed mobility  Supine to Sit: Stand by assistance  Sit to Supine: Unable to assess(left in chair at end of session)  Transfers  Sit to stand: Stand by assistance;Contact guard assistance Stand to sit: Stand by assistance;Contact guard assistance     Cognition  Overall Cognitive Status: WFL                 LUE AROM (degrees)  LUE AROM : WFL  RUE AROM (degrees)  RUE AROM : WFL  LUE Strength  Gross LUE Strength: WFL  RUE Strength  Gross RUE Strength: WFL                   Plan   Plan  Times per week: 3-5  Times per day: Daily  Current Treatment Recommendations: Balance Training, Patient/Caregiver Education & Training, Equipment Evaluation, Education, & procurement, Functional Mobility Training, Endurance Training, Safety Education & Training, Self-Care / ADL         OutComes Score       Kelle Nicolas scored a 21/24 on the -[facility] ADL Inpatient form. Current research shows that an AM-PAC score of 18 or greater is typically associated with a discharge to the patient's home setting. Based on the patient's AM-PAC score, and their current ADL deficits, it is recommended that the patient have 2-3 sessions per week of Occupational Therapy at d/c to increase the patient's independence. At this time, this patient demonstrates the endurance and safety to discharge home with home services and a follow up treatment frequency of 2-3x/wk. Please see assessment section for further patient specific details. If patient discharges prior to next session this note will serve as a discharge summary. Please see below for the latest assessment towards goals.                                               AM-PAC Score        AM-[facility] Inpatient Daily Activity Raw Score: 21 (02/24/21 1549)  -PAC Inpatient ADL T-Scale Score : 44.27 (02/24/21 1549)  ADL Inpatient CMS 0-100% Score: 32.79 (02/24/21 1549)  ADL Inpatient CMS G-Code Modifier : CJ (02/24/21 1549)    Goals  Short term goals  Time Frame for Short term goals: 2-3 days  Short term goal 1: Pt will be mod I with functional transfers  Short term goal 2: pt will be mod I with functional mobility  Short term goal 3: Pt will be mod I with bed mobility Short term goal 4: Pt will be mod i with bathing and dressing  Short term goal 5: Pt will be mod I with toileting  Long term goals  Time Frame for Long term goals : STG = LTG  Patient Goals   Patient goals :  To return home       Therapy Time   Individual Concurrent Group Co-treatment   Time In 1140         Time Out 1220         Minutes 40         Timed Code Treatment Minutes: [address], OT

## 2021-02-24 NOTE — PROGRESS NOTES
Data- discharge order received, pt verbalized agreement to discharge, disposition to previous residence, with home care. Action- discharge instructions prepared and given to Mrs Miesha Young, pt verbalized understanding. Medication information packet given r/t NEW and/or CHANGED prescriptions emphasizing name/purpose/side effects, pt verbalized understanding. Discharge instruction summary: Diet- general, Activity- as tolerated with a walker, Primary Care Physician as follows: Edi Martell [phone] f/u appointment already made with Dr Nish Sumner, , prescription medications filled by outpatient pharmacy. Inpatient surgical procedure precautions reviewed: for total knee. Response- Pt belongings gathered, IV removed. Disposition is home with home care, transported with her  , taken to lobby via w/c w/ transport, no complications.

## 2021-02-24 NOTE — PROGRESS NOTES
Patient in bed, she is alert and oriented x4. She was nauseated  this morning and given Zofran. She continued to be nauseated and has a  Scopolamine patch now. She was not able to work with therapy this morning related to the nausea, therapy will try again in a little while. She is drinking but did not eat much for breakfast. Vitals stable. Dressing to the left knee is clean, dry, intact, ice machine is in place, the left foot is warm, with positive pedal pulse and good pushes and pulls. She denies needs at this time.  Call light in reach, bed alarm set, will monitor

## 2021-02-24 NOTE — PROGRESS NOTES
[id number] [facility] Orthopedic Surgery   Progress Note      S/P :  SUBJECTIVE  In bed. Awake. States after up to BR right knee began to hurt more. She took pain med and is nauseated. Has hx nausea with pain med per . Pain is   described in left knee and with the intensity of moderate. Pain is described as aching. OBJECTIVE              Physical                      VITALS:  BP (!) 93/55   Pulse 80   Temp 97.6 °F (36.4 °C) (Oral)   Resp 18   Ht 5' 6\" (1.676 m)   Wt 187 lb 9.8 oz (85.1 kg)   SpO2 94%   BMI 30.28 kg/m²                     MUSCULOSKELETAL:  left foot NVI. Wiggles toes to command. Pedal pulses are palpable. NEUROLOGIC:                                  Sensory:  Touch:  Left Lower Extremity:  normal                                                 Surgical wound appears clean and dry left knee with Prineo dressing. Ice pad on. IGOR hose on.      Data       CBC:   Lab Results   Component Value Date    WBC 10.5 02/24/2021    RBC 3.61 02/24/2021    HGB 11.5 02/24/2021    HCT 33.5 02/24/2021    MCV 92.8 02/24/2021    MCH 32.0 02/24/2021    MCHC 34.4 02/24/2021    RDW 14.0 02/24/2021     02/24/2021    MPV 8.0 02/24/2021        WBC:    Lab Results   Component Value Date    WBC 10.5 02/24/2021        Hemoglobin/Hematocrit:    Lab Results   Component Value Date    HGB 11.5 02/24/2021    HCT 33.5 02/24/2021        PT/INR:    Lab Results   Component Value Date    PROTIME 12.1 02/15/2021    INR 1.04 02/15/2021              Current Inpatient Medications             Current Facility-Administered Medications: scopolamine (TRANSDERM-SCOP) transdermal patch 1 patch, 1 patch, Transdermal, Q72H  0.45 % sodium chloride infusion, , Intravenous, Continuous  sodium chloride flush 0.9 % injection 10 mL, 10 mL, Intravenous, 2 times per day  sodium chloride flush 0.9 % injection 10 mL, 10 mL, Intravenous, PRN  acetaminophen (TYLENOL) tablet 650 mg, 650 mg, Oral, Q6H oxyCODONE (ROXICODONE) immediate release tablet 5 mg, 5 mg, Oral, Q4H PRN **OR** oxyCODONE HCl (OXY-IR) immediate release tablet 10 mg, 10 mg, Oral, Q4H PRN  morphine (PF) injection 2 mg, 2 mg, Intravenous, Q3H PRN **OR** morphine (PF) injection 4 mg, 4 mg, Intravenous, Q3H PRN  ondansetron (ZOFRAN-ODT) disintegrating tablet 4 mg, 4 mg, Oral, Q8H PRN **OR** ondansetron (ZOFRAN) injection 4 mg, 4 mg, Intravenous, Q6H PRN  sennosides-docusate sodium (SENOKOT-S) 8.6-50 MG tablet 1 tablet, 1 tablet, Oral, BID  magnesium hydroxide (MILK OF MAGNESIA) 400 MG/5ML suspension 30 mL, 30 mL, Oral, Daily PRN  aspirin EC tablet 81 mg, 81 mg, Oral, BID    ASSESSMENT AND PLAN      PONV, added Scopolamine patch  Post left TKA, stable exam  DVT prophylaxis ordered, ASA 81mg twice at day for 14 days for DVT prophylaxis  PT OT for ADL's and ambulation as tolerated  SS for DC planning, home with home care later today if nausea subsides  IV or PO pain med as ordered    Ottoniel Lee [facility]  2/24/2021  9:18 AM

## 2021-02-24 NOTE — CARE COORDINATION
[facility] delivered requested 2-Wheeled Fleurette Amble to patient and reviewed insurance coverage and equipment set up with patient. Notified RN.     Thank you for the referral.  Electronically signed by Anshul Arrington on 2/24/2021 at 11:47 AM Cell ph# [phone]

## 2021-02-24 NOTE — PLAN OF CARE
Note: Fall risk assessment completed . Fall precautions in place, bed alarm on, side rails 2/4 up, call light in reach, educated pt on calling for assistance when needed, room clear of clutter. Pt verbalized understanding. Goal: Absence of physical injury  Description: Absence of physical injury  2/23/2021 2340 by Sai Morgan RN  Outcome: Ongoing  2/23/2021 1433 by Karen Campos RN  Outcome: Ongoing     Problem: Pain:  Goal: Pain level will decrease  Description: Pain level will decrease  2/23/2021 2340 by Sai Morgan RN  Outcome: Ongoing  2/23/2021 1433 by Karen Campos RN  Outcome: Ongoing  Note: Pain /discomfort being managed with PRN analgesics per MD orders. Patient able to express presence and absence of pain and rate pain appropriately using numerical scale.      Goal: Control of acute pain  Description: Control of acute pain  2/23/2021 2340 by Sai Morgan RN  Outcome: Ongoing  2/23/2021 1433 by Karen Campos RN  Outcome: Ongoing  Goal: Control of chronic pain  Description: Control of chronic pain  2/23/2021 2340 by Sai Morgan RN  Outcome: Ongoing  2/23/2021 1433 by Karen Campos RN  Outcome: Ongoing

## 2021-02-24 NOTE — DISCHARGE INSTR - DIET
? Good nutrition is important when healing from an illness, injury, or surgery. Follow any nutrition recommendations given to you during your hospital stay. ? If you were given an oral nutrition supplement while in the hospital, continue to take this supplement at home. You can take it with meals, in-between meals, and/or before bedtime. These supplements can be purchased at most local grocery stores, pharmacies, and chain [employer]-stores. ? If you have any questions about your diet or nutrition, call the hospital and ask for the dietitian.   General

## 2021-02-24 NOTE — PROGRESS NOTES
CLINICAL PHARMACY NOTE: MEDS TO [address] [place] [address] Drive Select Patient?: No  Total # of Prescriptions Filled: 2   The following medications were delivered to the patient:  · Oxycodone 5mg  · Aspirin 81mg  Total # of Interventions Completed: 0  Time Spent (min): 30    Additional Documentation:

## 2021-02-24 NOTE — PROGRESS NOTES
Physical Therapy    Facility/Department: [address] ORTHOPEDICS  Daily Treatment Notes, AM and PM session    This note serves as patient discharge summary if pt discharges prior to next PT visit      NAME: Gila Mcdowell  : 1955  MRN: [id number]    Date of Service: 2021    Discharge Recommendations:  24 hour supervision or assist, Home with assist PRN   PT Equipment Recommendations  Equipment Needed: Yes  Mobility Devices: Rodney Ly: Rolling    Assessment   Body structures, Functions, Activity limitations: Decreased functional mobility ; Decreased ROM; Decreased balance  Assessment: Prior to elective R TKR robot assisted via Dr. Rodney Platt 2021, patient lived in ranch home (2 [address] with L ascending rail) with spouse, and was independent in all functional mobility, including driving and gait without device. Status 2021: High nausea in AM, tolerating supine <> sit with SBA using looped belt as leg , EOB sitting, and L knee ROM only. PM session: SBA bed mobility, transfers, and RW ambulation x 50' with good tolerance and technique. She ascends 4 stairs with L rail and SBA.  present at AM session and very supportive. Patient appears appropriate for DC to home with family support and home PT level 1. She requires a rolling walker to promote safe and quality ambulation. Treatment Diagnosis: Impaired functional mobility  Prognosis: Good  History: as noted  PT Education: Goals;PT Role;Plan of Care;Transfer Training;General Safety;Gait Training;Disease Specific Education;Weight-bearing Education  Patient Education: care and use of cryocuff. HEP, proper positioning to achieve full knee extension and flexion. General activity expectations. REQUIRES PT FOLLOW UP: No(transition to next level of care.)  Activity Tolerance  Activity Tolerance: Patient Tolerated treatment well  Activity Tolerance: PM Session.  Limited by nausea in AM Patient Diagnosis(es): The primary encounter diagnosis was Osteoarthritis of knee, unspecified. A diagnosis of Arthritis of left knee was also pertinent to this visit. has a past medical history of Arthritis, Arthropathy, Endometrial cancer ([address] GERD (gastroesophageal reflux disease), and PONV (postoperative nausea and vomiting). has a past surgical history that includes Hysterectomy; Colonoscopy (07/01/2020); and other surgical history. Restrictions  Restrictions/Precautions  Restrictions/Precautions: Weight Bearing, Fall Risk  Lower Extremity Weight Bearing Restrictions  Left Lower Extremity Weight Bearing: Weight Bearing As Tolerated        Subjective  General  Chart Reviewed: Yes  Additional Pertinent Hx: 77 female to hospital 2- for elective L TKR robot assisted, via Dr. Elizabeth Stein. Patient with nausea post op. Other PMHx as noted. Response To Previous Treatment: Patient with no complaints from previous session. Family / Caregiver Present: Yes( present for am session.)  Referring Practitioner: Georgia Myers MD  Subjective  Subjective: AM: Patient in bed. Appears feeling poorly, with moderate nausea, especially any time she starts to move. Feels best being still, in bed. Agreeable to therapy. Rates L knee pain 3/10, 5/10 with EOB sitting. PM Session: Patient in [facility] chair. Reports nausea much improved, but she is nervous to eat or take pain medication for fear it will return. Rates L knee pain 5-6/10 following therapy, as well as L inner thigh pain. Cryocuff placed to L knee, and ice pack filled and placed to L inner thigh following therapy session.   Pain Screening  Patient Currently in Pain: Denies    Orientation  Orientation  Overall Orientation Status: Within Normal Limits     Social/Functional History  Social/Functional History  Lives With: Spouse  Type of Home: House  Home Layout: One level(including laundry)  Home Access: Stairs to enter with rails Entrance Stairs - Number of Steps: 2  Entrance Stairs - Rails: Left  Bathroom Shower/Tub: Walk-in shower  Bathroom Toilet: Standard  Bathroom Equipment: Built-in shower seat, Toilet raiser  Bathroom Accessibility: Walker accessible  ADL Assistance: Independent  Homemaking Assistance: Independent  Ambulation Assistance: Independent  Transfer Assistance: Independent  Active : Yes     Cognition   Cognition  Overall Cognitive Status: WFL    Objective  Bed mobility  Supine to Sit: Stand by assistance;Contact guard assistance(AM Session:  using looped belt as leg . Effortful, with patient reporting feeling sick.)  Sit to Supine: Stand by assistance(PM Session. Bed flat, using looped belt as a leg . Controlled.)  Transfers  Sit to Stand: Stand by assistance(cues for technique. BS chair and commode.)  Stand to sit: Stand by assistance(cues for technique. BS chair and commode)  Car Transfer: (At PM session, therapist demonstrates and verbally instructs in car transfer technique. Patient verb understanding.)  Comment: Transfers at PM session only. Too nauseated to attempt in AM.  Ambulation  Ambulation?: Yes  Ambulation 1  Surface: level tile  Device: Rolling Walker  Assistance: Stand by assistance;Supervision  Quality of Gait: Interrupted, step through pattern. Cues to stay within wh walker base. L knee in ~10 degrees flexion throughout. Stable @ RW. Distance: 50'. 10' x 2  Comments: All gait and restroom use occur at PM session. Too nauseated to mobilize in AM session. Patient uses commode and urinates. Manages briefs, pants, pericare with supervision. Stands at sink and washes hands with SBA. Stairs/Curb  Stairs?: Yes  Stairs  # Steps : 4  Stairs Height: 6\"  Rails: Left ascending  Assistance: Stand by assistance;Contact guard assistance  Comment: cues for sequencing. Performs 1st stair with B rails at assure safety/stability of surgical leg, then 3 stairs with L rail only, ascending and descending. Balance  Posture: Good  Sitting - Static: Good  Sitting - Dynamic: Good  Standing - Static: Good(@ RW)  Standing - Dynamic: Good(At RW)  Exercises  Quad Sets: x 10  Gluteal Sets: x 10  Knee Active Range of Motion: 0-75  Ankle Pumps: x 10  Comments: All exs in PM session only, except Knee flexion AROM-AAROM in sitting, with foot on glide. X 4 AM session; x 4 PM session. Written TKR HEP, including positioning instruction to maintain knee felxion/extension, activity expectation, care and use of cryocuff,  provided. Reviewed with  in am (with patient attempting to listen, but unable to stay awake).  verb understanding. Therapist reviews entire HEP with patient at PM session. She verb understanding. Plan   Plan  Times per week: 1-4 visits as needed  Current Treatment Recommendations: Functional Mobility Training, Transfer Training, Gait Training, Stair training, Home Exercise Program, Patient/Caregiver Education & Training, Equipment Evaluation, Education, & procurement  Safety Devices  Type of devices: Bed alarm in place, Call light within reach, Nurse notified(At end of both AM and PM sessions. NARCISA Dey notified)    AM-PAC Score  AM-PAC Inpatient Mobility Raw Score : 20 (02/24/21 1557)  AM-PAC Inpatient T-Scale Score : 47.67 (02/24/21 1557)  Mobility Inpatient CMS 0-100% Score: 35.83 (02/24/21 1557)  Mobility Inpatient CMS G-Code Modifier : CJ (02/24/21 1557)     Goals  Short term goals  Time Frame for Short term goals: By Acute DC.  2-: all goals met. Short term goal 1: Transfers SBA  Short term goal 2: Gait RW 50' SBA  Short term goal 3: 4 stairs L rail, CGA and cues  Short term goal 4: Tolerates 5-10 reps initial TKR HEP exs  Patient Goals   Patient goals : \"To walk without a limp. To be able to walk my stairs one foot after the other. \"       Therapy Time   Individual Concurrent Group Co-treatment   Time In 0926         Time Out 1001(AM session.   PM session: 1389-5578) Minutes 4787 Jennifer Florence, PT  Electronically signed by Subhash Jaimes, [address] [place] [address] Drive (#[phone])  on 2/24/2021 at 4:30 PM

## 2021-02-25 ENCOUNTER — TELEPHONE (OUTPATIENT)
Dept: ORTHOPEDIC SURGERY | Age: 66
End: 2021-02-25

## 2021-02-25 NOTE — TELEPHONE ENCOUNTER
Blue Boss from [facility] [phone]      PT needs verbal orders to sign off on PT, also has questions about meds wants to know if patient can take oxycodone and tylenol, and wants to know if patient can take over the counter stool softener,because patient is complaining of being constipated.

## 2021-02-26 ENCOUNTER — TELEPHONE (OUTPATIENT)
Dept: ORTHOPEDIC SURGERY | Age: 66
End: 2021-02-26

## 2021-02-26 NOTE — TELEPHONE ENCOUNTER
[address] [place] Name: [address] [facility] [address]  Contact Name: Mami Brice Number: [id number]  Request or Information: NEEDING NURSING ORDERS SIGNED

## 2021-03-02 ENCOUNTER — TELEPHONE (OUTPATIENT)
Dept: ORTHOPEDICS UNIT | Age: 66
End: 2021-03-02

## 2021-03-02 NOTE — TELEPHONE ENCOUNTER
Spoke with patient regarding post discharge from hospital.    Incision status: No drainage, redness,  odor noted per patient, bruising noted. Edema/Swelling/Teds: edema noted improving, wearing teds    Pain level and status: 2/10 tolerable     Use of pain medications: yes ; Patient stated they are taking their pain medication oxycodone as prescribed. Taking tylneol as needed    Use of ice therapy: yes     Blood thinner: aspirin ; Verified with patient that they are taking their anticoagulant as prescribed twice a day. Bowels: last bm today, reports small, educated patient to increase fluid and fiber intake and to take a laxative as needed, patient verbalized understanding. Home Care Agency active: yes ; Claims already worked with therapist .  Outpatient therapy: n/a    Do you have all of your medications: yes    Changes in medications: no    Ortho Vitals: n/a      No other questions/concerns at this time. Encouraged patient to call Orthopedic Nurse Navigator [place] or Orthopedic office if has any questions/concerns.       Follow up appointments:    Future Appointments   Date Time Provider Cindy Yepez   3/8/2021  1:30 PM MD Nida Hare     Electronically signed by Nino Looney RN on 3/2/2021 at 2:48 PM

## 2021-03-08 ENCOUNTER — OFFICE VISIT (OUTPATIENT)
Dept: ORTHOPEDIC SURGERY | Age: 66
End: 2021-03-08
Payer: MEDICARE

## 2021-03-08 VITALS — TEMPERATURE: 97.5 F

## 2021-03-08 DIAGNOSIS — M17.9 OSTEOARTHRITIS OF KNEE, UNSPECIFIED: ICD-10-CM

## 2021-03-08 DIAGNOSIS — Z96.652 STATUS POST TOTAL LEFT KNEE REPLACEMENT: Primary | ICD-10-CM

## 2021-03-08 DIAGNOSIS — M17.11 PRIMARY OSTEOARTHRITIS OF RIGHT KNEE: ICD-10-CM

## 2021-03-08 PROCEDURE — 20610 DRAIN/INJ JOINT/BURSA W/O US: CPT | Performed by: ORTHOPAEDIC SURGERY

## 2021-03-08 PROCEDURE — 99213 OFFICE O/P EST LOW 20 MIN: CPT | Performed by: ORTHOPAEDIC SURGERY

## 2021-03-08 RX ORDER — OXYCODONE HYDROCHLORIDE 5 MG/1
5-10 TABLET ORAL EVERY 6 HOURS PRN
Qty: 40 TABLET | Refills: 0 | Status: SHIPPED | OUTPATIENT
Start: 2021-03-08 | End: 2021-03-13

## 2021-03-08 NOTE — PROGRESS NOTES
[facility]    Chief Complaint: Follow-up s/p left total knee arthroplasty; right knee pain    HPI:  Chanel Yen is a 77 y.o. who is here in follow-up of left total knee arthroplasty performed on February 23, 2021. She is also here in follow-up of right knee pain. She is using a cane for ambulation. She continues to work with home physical therapy. She is taking oxycodone and Tylenol as needed for pain. She is weaning off of oxycodone. Right knee is hurting her on a daily basis. She has known osteoarthritis in this knee. She has not had a steroid injection in quite some time. Patient Active Problem List   Diagnosis    Osteoarthritis of knee, unspecified       ROS:  Constitutional: denies fever, chills, weight loss  MSK: denies pain in other joints, muscle aches  Neurological: denies numbness, tingling, weakness    Exam:  Temperature 97.5 °F (36.4 °C), temperature source Temporal.    Appearance: sitting in exam room chair, appears to be in no acute distress, awake and alert  Resp: unlabored breathing on room air  Skin: warm, dry and intact with out erythema or significant increased temperature  Neuro: grossly intact both lower extremities. Intact sensation to light touch. Motor exam 4+ to 5/5 in all major motor groups. Left knee: Incision is healing as expected. Range of motion is 5 to 90 degrees. Sensation is intact light touch. There is brisk capillary refill. Dorsalis pedis and posterior tibial pulses are intact. There is 5/5 muscle strength in all muscle groups. Right knee: Examination reveals that knee range of motion is 0 to 125 degrees. There is varus deformity, positive crepitus, positive joint line tenderness, positive antalgic gait. Neurologically, plantar flexion and dorsiflexion is intact. Pulses palpable distally. 5/5 strength. Imaging:  3 views of the left knee were performed and reviewed today.  Significant for total knee arthroplasty prosthesis in place with no signs of osteolysis, loosening, fracture, or dislocation. Assessment:  S/p left total knee arthroplasty    Plan:  She is doing well recovering from left knee replacement. She will transition outpatient physical therapy. Oxycodone was refilled today. She will also start taking ibuprofen when she is done with her aspirin for DVT prophylaxis. As for her right knee, she is having pain on a daily basis due to osteoarthritis. We discussed a steroid injection to help her with this pain. This was performed as described below. She will follow up in 3 to 4 weeks for range of motion check on her left knee. Procedure:  After verbal consent was obtained, the patient's right knee was prepped with betadine. Skin was anesthetized with ethyl chloride. The knee was then injected under sterile technique with 2mL of 0.25% marcaine and 1 mL of 40 mg/mL Kenalog. A bandage was applied. The patient tolerated the procedure well and there were no complications. Total time spent on today's encounter was between 20-29 minutes. This time included reviewing prior notes, radiographs, and lab results when available, reviewing history obtained by medical assistant, performing history and physical exam, reviewing tests/radiographs with the patient, counseling the patient, ordering medications or tests, documentation in the electronic health record, and coordination of care. This dictation was done with Dragon dictation and may contain mechanical errors related to translation.

## 2021-03-12 ENCOUNTER — HOSPITAL ENCOUNTER (OUTPATIENT)
Dept: PHYSICAL THERAPY | Age: 66
Setting detail: THERAPIES SERIES
Discharge: HOME | End: 2021-03-12
Payer: MEDICARE

## 2021-03-12 PROCEDURE — 97161 PT EVAL LOW COMPLEX 20 MIN: CPT

## 2021-03-12 PROCEDURE — 97140 MANUAL THERAPY 1/> REGIONS: CPT

## 2021-03-12 PROCEDURE — 97164 PT RE-EVAL EST PLAN CARE: CPT

## 2021-03-12 PROCEDURE — 97110 THERAPEUTIC EXERCISES: CPT

## 2021-03-12 NOTE — FLOWSHEET NOTE
Physical Therapy Re-Certification Plan of Care/MD UPDATE      Dear Referring Practitioner: Dr Sendy Barba,    We had the pleasure of treating the following patient for physical therapy services at [facility]. A summary of our findings can be found in the updated assessment below. This includes our plan of care. If you have any questions or concerns regarding these findings, please do not hesitate to contact me at the office phone number checked above. Thank you for the referral.     Physician Signature:________________________________Date:__________________  By signing above (or electronic signature), therapists plan is approved by physician      Overall Response to Treatment:   [x]Patient is responding well to treatment and improvement is noted with regards  to goals   []Patient should continue to improve in reasonable time if they continue HEP   []Patient has plateaued and is no longer responding to skilled PT intervention    []Patient is getting worse and would benefit from return to referring MD   []Patient unable to adhere to initial POC   []Other:       Recommendation:    [x]Continue PT 1-3x / wk for6-8  weeks. [address]  Phone: [phone]   Fax:     [phone]    PLAN  OF CARE  Date:  3/12/2021    Patient Name:  Champ Camargo    :  1955  MRN: [id number]    Pertinent Medical History:Additional Pertinent Hx: cancer    Medical/Treatment Diagnosis Information:  Status post total left knee replacement    · Treatment Diagnosis: decreased abilty to ambulate and function    Insurance/Certification information:  PT Insurance Information: [employer] Medicare  Physician Information:  Referring Practitioner: Dr Coral Jang of care signed (Y/N): routed        Visit # POC/Insurance Allowable Auth Needed   1 Per insurance []Yes []No     Latex Allergy:  [x]NO      []YES  Preferred Language for Healthcare:   [x]English       []Other:    Functional Scale:       Date assessed: at [facility]  womac- 72    Pain level:  5/10     History of Injury: Pt is a [age]y/o female with a hx of left knee pain with a resultant left tka on 2/23/21. She was seen at home for a few weeks and comes to start PT. She c/o constant aching pain which is worst in the am or after walking a lot. She is walking with a cane at this time. She can't do steps normal.  She hopes to return to gardening and walking  normal.     SUBJECTIVE:  Pt states, \" Im ready for surgery \"    OBJECTIVE: see eval   Test measurements:    initial 3/12/21       Flex 90       ext -10       Strength- 3/5                                                                                                         RESTRICTIONS/PRECAUTIONS:    Exercises/Interventions:     Therapeutic Ex (12786)   Min: Reps/Resistance Notes/CUES   Nu step L2 x 6 min    Leg press     Heel slide     Ext stretch     laq/saq     Qs with towel     clams     Hip flex stretch off table     Prone flex if able                                   Manual Intervention (06585)  Min:      Mfr to entire leg, gastroc, prom, patellar mob gr 3 all directions    NMR re-education (51018)  Min:     wobble     Semi squat     Step tap/ecc     Step up                                        Therapeutic Activity (63652)  Min:          Gait Training (27061)  Min:           Modalities  Min:            Other Therapeutic Activities:   Patient was thoroughly educated on this date regarding prehabilitation goals, importance of PT sessions in improving overall ROM, strength and stability prior to surgery, and how prehabilitation will facilitate improved post-operative outcomes. The patient was educated on and instructed in HEP as listed. The patient was given a detailed handout for exercises to initiate in the hospital post-operatively as well as at home.  The discharge plan from the hospital was reviewed with the patient; specifically, to reduce length of hospital stay and to minimize time before reinitiating outpatient physical therapy after surgery. Education regarding early mobility post-operatively in the hospital and emphasis on working with both physical therapy and nursing staff to achieve ambulation goal was provided. The patient was highly encouraged to attend joint class in hospital prior to surgery for further instructions on pre and post-surgical care. Also, education regarding goals and expectations was provided; specifically, knee flexion range of motion greater than or equal to 90 degrees and 0 degrees knee extension to promote improved gait mechanics and ambulation. The patient was encouraged to utilize ice/cold pack after surgery to address pain, minimize swelling as often as possible. It is in my medical opinion that this patient is clear from all physical barriers prior to consideration for surgery, activity modifications prior to and post operatively have been discussed with this patient as well as discharge planning and is cleared for surgery from physical therapy perspective. Home Exercise Program:  Patient instructed in the above exercises for HEP. Patient verbalized/demonstrated understanding and was issued written handout  Seated hamstring Stretch 30 sec x 5  Seated Calf Stretch           30 sec x 5  Seated Flexion Stretch      30 sec x 5  Seated Heel Slide  x30  Long Arc Quad      x 30  Quad Set              x 30  Prone Flexion       x 20  Straight Leg Raise   x 20  Glut Set                  x 20  Ankle Pump          x 20  Semi Squat         x 20  .        Therapeutic Exercise and NMR EXR  [] (24190) Provided verbal/tactile cueing for activities related to strengthening, flexibility, endurance, ROM for improvements in LE, proximal hip, and core control with self care, mobility, lifting, ambulation.  [] (15080) Provided verbal/tactile cueing Timed Code Treatment Minutes: 45   Total Treatment Minutes: 45      [x] EVAL (LOW) 69648 (typically 20 minutes face-to-face)  [] EVAL (MOD) 50198 (typically 30 minutes face-to-face)  [] EVAL (HIGH) 29420 (typically 45 minutes face-to-face)  [] RE-EVAL     [x] JN(91925) x  2   [] Dry needle 1 or 2 Muscles (87458)  [] NMR (64892) x     [] Dry needle 3+ Muscles (11315)  [x] Manual (67967) x 1  [] Ultrasound (06136) x  [] TA (38658) x     [] Mech Traction (97491)  [] ES(attended) (78086)     [] ES (un) (49090):   [] Vasopump (05364) [] Ionto (79748)   [] Gait (20288)  [] Other:        ASSESSMENT:  See eval    Treatment/Activity Tolerance:  [x] Patient tolerated treatment well [] Patient limited by fatique  [] Patient limited by pain  [] Patient limited by other medical complications  [] Other:     Overall Progression Towards Functional goals/ Treatment Progress Update:  [] Patient is progressing as expected towards functional goals listed. [] Progression is slowed due to complexities/Impairments listed. [] Progression has been slowed due to co-morbidities. [x] Plan just implemented, too soon to assess goals progression <30days   [] Goals require adjustment due to lack of progress  [] Patient is not progressing as expected and requires additional follow up with physician  [] Other    Prognosis for POC: [x] Good [] Fair  [] Poor    Patient requires continued skilled intervention: [] Yes  [x] No  GOALS:  Patient stated goal:\" I want to learn exercises to help with surgery \"  []? Progressing: []? Met: []? Not Met: []? Adjusted     Therapist goals for Patient:   Short Term Goals: To be achieved in:   Goals-  Pt will have 120 flex, 0 ext to help him reach is [id number]  Pt will have 5-/5 strength to help surinder  reach his goals  Pt will ambualte with a straight cane to help her meet his goals             PLAN   LE arom, prom  strength, proprioception, gait, balance, functional activities.   Mfr, joint mobs, mods as needed, hep. Progress as tolerated 1-3x wk x 6-8 wks         [] Continue per plan of care [] Alter current plan (see comments)  [] Plan of care initiated [] Hold pending MD visit [x] Discharge    Electronically signed by: Jessica Dumont PT    Note: If patient does not return for scheduled/recommended follow up visits, this note will serve as a discharge from care along with the most recent update on progress.

## 2021-03-16 ENCOUNTER — HOSPITAL ENCOUNTER (OUTPATIENT)
Dept: PHYSICAL THERAPY | Age: 66
Setting detail: THERAPIES SERIES
Discharge: HOME | End: 2021-03-16
Payer: MEDICARE

## 2021-03-16 PROCEDURE — 97140 MANUAL THERAPY 1/> REGIONS: CPT

## 2021-03-16 PROCEDURE — 97110 THERAPEUTIC EXERCISES: CPT

## 2021-03-16 NOTE — FLOWSHEET NOTE
Physical Therapy Re-Certification Plan of Care/MD UPDATE      Dear  ,    We had the pleasure of treating the following patient for physical therapy services at [facility]. A summary of our findings can be found in the updated assessment below. This includes our plan of care. If you have any questions or concerns regarding these findings, please do not hesitate to contact me at the office phone number checked above. Thank you for the referral.     Physician Signature:________________________________Date:__________________  By signing above (or electronic signature), therapists plan is approved by physician      Overall Response to Treatment:   [x]Patient is responding well to treatment and improvement is noted with regards  to goals   []Patient should continue to improve in reasonable time if they continue HEP   []Patient has plateaued and is no longer responding to skilled PT intervention    []Patient is getting worse and would benefit from return to referring MD   []Patient unable to adhere to initial POC   []Other:       Recommendation:    [x]Continue PT 1-3x / wk for6-8  weeks. Sulema.  [address]  Phone: [phone]   Fax:     [phone]    PLAN  OF CARE  Date:  3/16/2021    Patient Name:  Radha Delgado    :  1955  MRN: [id number]    Pertinent Medical History:Additional Pertinent Hx: cancer    Medical/Treatment Diagnosis Information:  Status post total left knee replacement    · Treatment Diagnosis: decreased abilty to ambulate and function    Insurance/Certification information:  PT Insurance Information: [facility] Medicare  Physician Information:  Referring Practitioner: Dr Loco Martinez of care signed (Y/N): routed        Visit # POC/Insurance Allowable Auth Needed   2 Per insurance []Yes   []No     Latex Allergy:  [x]NO []YES  Preferred Language for Healthcare:   [x]English       []Other:    Functional Scale:       Date assessed: at [facility]  womac- 72    Pain level:  4-5/10     History of Injury: Pt is a [age]y/o female with a hx of left knee pain with a resultant left tka on 2/23/21. She was seen at home for a few weeks and comes to start PT. She c/o constant aching pain which is worst in the am or after walking a lot. She is walking with a cane at this time. She can't do steps normal.  She hopes to return to gardening and walking  normal.     SUBJECTIVE:  Pt states, \" Im ready for surgery \"  03/16/21 Patient reports knee is doing ok,just stiffness. OBJECTIVE: see eval   Test measurements:    initial 3/12/21 3/16/21      Flex 90 100      ext -10 -8      Strength- 3/5                                                                                                         RESTRICTIONS/PRECAUTIONS:    Exercises/Interventions:     Therapeutic Ex (26396)   Min: Reps/Resistance Notes/CUES   Nu step seat 10 L2 x 6 min    Leg press 60 # 2 x 20    Heel slide 2 min    Ext stretch 3 x 30 sec     laq/saq 2 # 30 x each    Qs with towel 5 sec x 10    clams     Hip flex stretch off table     Prone flex if able     incline 3 x 30 sec                              Manual Intervention (42836)  Min:      Mfr to entire leg, gastroc, prom, patellar mob gr 3 all directions    NMR re-education (92724)  Min:     wobble     Semi squat     Step tap/ecc     Step up                                        Therapeutic Activity (07780)  Min:          Gait Training (73628)  Min:           Modalities  Min:            Other Therapeutic Activities:   Patient was thoroughly educated on this date regarding prehabilitation goals, importance of PT sessions in improving overall ROM, strength and stability prior to surgery, and how prehabilitation will facilitate improved post-operative outcomes. The patient was educated on and instructed in HEP as listed.  The patient was given a detailed handout for exercises to initiate in the hospital post-operatively as well as at home. The discharge plan from the hospital was reviewed with the patient; specifically, to reduce length of hospital stay and to minimize time before reinitiating outpatient physical therapy after surgery. Education regarding early mobility post-operatively in the hospital and emphasis on working with both physical therapy and nursing staff to achieve ambulation goal was provided. The patient was highly encouraged to attend joint class in hospital prior to surgery for further instructions on pre and post-surgical care. Also, education regarding goals and expectations was provided; specifically, knee flexion range of motion greater than or equal to 90 degrees and 0 degrees knee extension to promote improved gait mechanics and ambulation. The patient was encouraged to utilize ice/cold pack after surgery to address pain, minimize swelling as often as possible. It is in my medical opinion that this patient is clear from all physical barriers prior to consideration for surgery, activity modifications prior to and post operatively have been discussed with this patient as well as discharge planning and is cleared for surgery from physical therapy perspective. Home Exercise Program:  Patient instructed in the above exercises for HEP. Patient verbalized/demonstrated understanding and was issued written handout  Seated hamstring Stretch 30 sec x 5  Seated Calf Stretch           30 sec x 5  Seated Flexion Stretch      30 sec x 5  Seated Heel Slide  x30  Long Arc Quad      x 30  Quad Set              x 30  Prone Flexion       x 20  Straight Leg Raise   x 20  Glut Set                  x 20  Ankle Pump          x 20  Semi Squat         x 20  .        Therapeutic Exercise and NMR EXR  [] (79841) Provided verbal/tactile cueing for activities related to strengthening, flexibility, endurance, ROM for improvements in LE, proximal hip, and core control with self care, mobility, lifting, ambulation.  [] (87407) Provided verbal/tactile cueing for activities related to improving balance, coordination, kinesthetic sense, posture, motor skill, proprioception  to assist with LE, proximal hip, and core control in self care, mobility, lifting, ambulation and eccentric single leg control.  [address] and Therapeutic Activities:    [] (45568 or 06387) Provided verbal/tactile cueing for activities related to improving balance, coordination, kinesthetic sense, posture, motor skill, proprioception and motor activation to allow for proper function of core, proximal hip and LE with self care and ADLs and functional mobility.   [] (20126) Gait Re-education- Provided training and instruction to the patient for proper LE, core and proximal hip recruitment and positioning and eccentric body weight control with ambulation re-education including up and down stairs     Gait Training:  [] (00087) Provided training and instruction to the patient for proper postural muscle recruitment and positioning with ambulation re-education     Home Exercise Program:    [x] (03137) Reviewed/Progressed HEP activities related to strengthening, flexibility, endurance, ROM of core, proximal hip and LE for functional self-care, mobility, lifting and ambulation/stair navigation   [] (41963)Reviewed/Progressed HEP activities related to improving balance, coordination, kinesthetic sense, posture, motor skill, proprioception of core, proximal hip and LE for self care, mobility, lifting, and ambulation/stair navigation      Manual Treatments:  PROM / STM / Oscillations-Mobs:  G-I, II, III, IV (PA's, Inf., Post.)  [] (60041) Provided manual therapy to mobilize LE, proximal hip and/or LS spine soft tissue/joints for the purpose of modulating pain, promoting relaxation,  increasing ROM, reducing/eliminating soft tissue swelling/inflammation/restriction, improving soft tissue extensibility and allowing for proper ROM for normal function with self care, mobility, lifting and ambulation. Charges:  Timed Code Treatment Minutes: 45   Total Treatment Minutes: 45      [] EVAL (LOW) 81525 (typically 20 minutes face-to-face)  [] EVAL (MOD) 19994 (typically 30 minutes face-to-face)  [] EVAL (HIGH) 18569 (typically 45 minutes face-to-face)  [] RE-EVAL     [x] OC(34694) x  2   [] Dry needle 1 or 2 Muscles (17829)  [] NMR (51265) x     [] Dry needle 3+ Muscles (87006)  [x] Manual (68605) x 1  [] Ultrasound (65911) x  [] TA (44117) x     [] Mech Traction (17220)  [] ES(attended) (37321)     [] ES (un) (42364):   [] Vasopump (19704) [] Ionto (90722)   [] Gait (73099)  [] Other:        ASSESSMENT:  See eval    Treatment/Activity Tolerance:  [x] Patient tolerated treatment well [] Patient limited by fatique  [] Patient limited by pain  [] Patient limited by other medical complications  [] Other:     Overall Progression Towards Functional goals/ Treatment Progress Update:  [] Patient is progressing as expected towards functional goals listed. [] Progression is slowed due to complexities/Impairments listed. [] Progression has been slowed due to co-morbidities. [x] Plan just implemented, too soon to assess goals progression <30days   [] Goals require adjustment due to lack of progress  [] Patient is not progressing as expected and requires additional follow up with physician  [] Other    Prognosis for POC: [x] Good [] Fair  [] Poor    Patient requires continued skilled intervention: [] Yes  [x] No  GOALS:  Patient stated goal:\" I want to learn exercises to help with surgery \"  []? Progressing: []? Met: []? Not Met: []? Adjusted     Therapist goals for Patient:   Short Term Goals:  To be achieved in:   Goals-  Pt will have 120 flex, 0 ext to help him reach is [id number]  Pt will have 5-/5 strength to help surinder  reach his goals  Pt will ambualte with a straight cane to help her meet his goals        PLAN   LE arom, prom  strength, proprioception, gait, balance, functional activities. Mfr, joint mobs, mods as needed, hep. Progress as tolerated 1-3x wk x 6-8 wks         [] Continue per plan of care [] Alter current plan (see comments)  [] Plan of care initiated [] Hold pending MD visit [x] Discharge    Electronically signed by: Cary Colon PTA    Note: If patient does not return for scheduled/recommended follow up visits, this note will serve as a discharge from care along with the most recent update on progress.

## 2021-03-18 ENCOUNTER — HOSPITAL ENCOUNTER (OUTPATIENT)
Dept: PHYSICAL THERAPY | Age: 66
Setting detail: THERAPIES SERIES
Discharge: HOME | End: 2021-03-18
Payer: MEDICARE

## 2021-03-18 PROCEDURE — 97032 APPL MODALITY 1+ESTIM EA 15: CPT

## 2021-03-18 PROCEDURE — 97110 THERAPEUTIC EXERCISES: CPT

## 2021-03-18 NOTE — FLOWSHEET NOTE
Physical Therapy Re-Certification Plan of Care/MD UPDATE      Dear  ,    We had the pleasure of treating the following patient for physical therapy services at [facility]. A summary of our findings can be found in the updated assessment below. This includes our plan of care. If you have any questions or concerns regarding these findings, please do not hesitate to contact me at the office phone number checked above. Thank you for the referral.     Physician Signature:________________________________Date:__________________  By signing above (or electronic signature), therapists plan is approved by physician      Overall Response to Treatment:   [x]Patient is responding well to treatment and improvement is noted with regards  to goals   []Patient should continue to improve in reasonable time if they continue HEP   []Patient has plateaued and is no longer responding to skilled PT intervention    []Patient is getting worse and would benefit from return to referring MD   []Patient unable to adhere to initial POC   []Other:       Recommendation:    [x]Continue PT 1-3x / wk for6-8  weeks.  [facility]  [address]  Phone: [phone]   Fax:     [phone]    PLAN  OF CARE  Date:  3/18/2021    Patient Name:  Shari Mao    :  1955  MRN: [id number]    Pertinent Medical History:Additional Pertinent Hx: cancer    Medical/Treatment Diagnosis Information:  Status post total left knee replacement    · Treatment Diagnosis: decreased abilty to ambulate and function    Insurance/Certification information:  PT Insurance Information: [employer] Medicare  Physician Information:  Referring Practitioner: Dr Lacie Fabian of care signed (Y/N): routed        Visit # POC/Insurance Allowable Auth Needed   3 Per insurance []Yes   []No     Latex Allergy:  [x]NO []YES  Preferred Language for Healthcare:   [x]English       []Other:    Functional Scale:       Date assessed: at [facility]  womac- 72    Pain level:  4-5/10     History of Injury: Pt is a [age]y/o female with a hx of left knee pain with a resultant left tka on 2/23/21. She was seen at home for a few weeks and comes to start PT. She c/o constant aching pain which is worst in the am or after walking a lot. She is walking with a cane at this time. She can't do steps normal.  She hopes to return to gardening and walking  normal.     SUBJECTIVE:  Pt states, \" Im ready for surgery \"  03/16/21 Patient reports knee is doing ok,just stiffness. OBJECTIVE: see eval   Test measurements:    initial 3/12/21 3/16/21 3/18/21     Flex 90 100 103     ext -10 -8 -5     Strength- 3/5                                                                                                         RESTRICTIONS/PRECAUTIONS:    Exercises/Interventions:     Therapeutic Ex (52036)   Min: Reps/Resistance Notes/CUES   Nu step seat 10 L2 x 6 min    Leg press 60 # 2 x 20    Heel slide 2 min    Ext stretch 3 x 30 sec     laq/saq 2 # 30 x each    Qs with towel 5 sec x 10    clams     Hip flex stretch off table     Prone flex if able     incline 3 x 30 sec                              Manual Intervention (46946)  Min:      Mfr to entire leg, gastroc, prom, patellar mob gr 3 all directions    NMR re-education (03958)  Min:     wobble     Semi squat     Step tap/ecc     Step up                                        Therapeutic Activity (71780)  Min:          Gait Training (88607)  Min:           Modalities  Min:            Other Therapeutic Activities:   Patient was thoroughly educated on this date regarding prehabilitation goals, importance of PT sessions in improving overall ROM, strength and stability prior to surgery, and how prehabilitation will facilitate improved post-operative outcomes.  The patient was educated on and instructed in HEP as listed. The patient was given a detailed handout for exercises to initiate in the hospital post-operatively as well as at home. The discharge plan from the hospital was reviewed with the patient; specifically, to reduce length of hospital stay and to minimize time before reinitiating outpatient physical therapy after surgery. Education regarding early mobility post-operatively in the hospital and emphasis on working with both physical therapy and nursing staff to achieve ambulation goal was provided. The patient was highly encouraged to attend joint class in hospital prior to surgery for further instructions on pre and post-surgical care. Also, education regarding goals and expectations was provided; specifically, knee flexion range of motion greater than or equal to 90 degrees and 0 degrees knee extension to promote improved gait mechanics and ambulation. The patient was encouraged to utilize ice/cold pack after surgery to address pain, minimize swelling as often as possible. It is in my medical opinion that this patient is clear from all physical barriers prior to consideration for surgery, activity modifications prior to and post operatively have been discussed with this patient as well as discharge planning and is cleared for surgery from physical therapy perspective. Home Exercise Program:  Patient instructed in the above exercises for HEP. Patient verbalized/demonstrated understanding and was issued written handout  Seated hamstring Stretch 30 sec x 5  Seated Calf Stretch           30 sec x 5  Seated Flexion Stretch      30 sec x 5  Seated Heel Slide  x30  Long Arc Quad      x 30  Quad Set              x 30  Prone Flexion       x 20  Straight Leg Raise   x 20  Glut Set                  x 20  Ankle Pump          x 20  Semi Squat         x 20  .        Therapeutic Exercise and NMR EXR  [] (19401) Provided verbal/tactile cueing for activities related to strengthening, flexibility, endurance, ROM for meet his goals             PLAN   LE arom, prom  strength, proprioception, gait, balance, functional activities. Mfr, joint mobs, mods as needed, hep. Progress as tolerated 1-3x wk x 6-8 wks         [x] Continue per plan of care [] Alter current plan (see comments)  [] Plan of care initiated [] Hold pending MD visit [x] Discharge    Electronically signed by: Mick Cummings, Jagdish Turpin  Note: If patient does not return for scheduled/recommended follow up visits, this note will serve as a discharge from care along with the most recent update on progress.

## 2021-03-19 ENCOUNTER — TELEPHONE (OUTPATIENT)
Dept: ORTHOPEDIC SURGERY | Age: 66
End: 2021-03-19

## 2021-03-19 ENCOUNTER — APPOINTMENT (OUTPATIENT)
Dept: PHYSICAL THERAPY | Age: 66
End: 2021-03-19
Payer: MEDICARE

## 2021-03-19 RX ORDER — TIZANIDINE 4 MG/1
4 TABLET ORAL 3 TIMES DAILY
Qty: 30 TABLET | Refills: 0 | Status: SHIPPED | OUTPATIENT
Start: 2021-03-19 | End: 2021-03-29

## 2021-03-19 NOTE — TELEPHONE ENCOUNTER
General Question     Subject: QUESTIONS  Patient and /or Facility Request:NENA CURRAN  Contact Number: [phone]  PATIENT HAS QUESTIONS

## 2021-03-24 ENCOUNTER — HOSPITAL ENCOUNTER (OUTPATIENT)
Dept: PHYSICAL THERAPY | Age: 66
Setting detail: THERAPIES SERIES
Discharge: HOME | End: 2021-03-24
Payer: MEDICARE

## 2021-03-24 PROCEDURE — 97140 MANUAL THERAPY 1/> REGIONS: CPT

## 2021-03-24 PROCEDURE — 97110 THERAPEUTIC EXERCISES: CPT

## 2021-03-24 NOTE — FLOWSHEET NOTE
PLAN  OF CARE  Date:  3/24/2021    Patient Name:  Cata Sampson    :  1955  MRN: [id number]    Pertinent Medical History:Additional Pertinent Hx: cancer    Medical/Treatment Diagnosis Information:  Status post total left knee replacement    · Treatment Diagnosis: decreased abilty to ambulate and function    Insurance/Certification information:  PT Insurance Information: [facility] Medicare  Physician Information:  Referring Practitioner: Dr Leonel Cobian of care signed (Y/N): routed        Visit # POC/Insurance Allowable Auth Needed   4  7 Per insurance []Yes   []No     Latex Allergy:  [x]NO      []YES  Preferred Language for Healthcare:   [x]English       []Other:    Functional Scale:       Date assessed: at al  [facility]- 72    Pain level:  4-5/10     History of Injury: Pt is a [age]y/o female with a hx of left knee pain with a resultant left tka on 21. She was seen at home for a few weeks and comes to start PT. She c/o constant aching pain which is worst in the am or after walking a lot. She is walking with a cane at this time. She can't do steps normal.  She hopes to return to gardening and walking  normal.     SUBJECTIVE:  Pt states, \" Im ready for surgery \"  21 Patient reports knee is doing ok,just stiffness.   3/24/21  Pt states, \" still having some spasms at night \"    OBJECTIVE: see eval   Test measurements:    initial 3/12/21 3/16/21 3/18/21     Flex 90 100 103     ext -10 -8 -5     Strength- 3/5                                                                                                         RESTRICTIONS/PRECAUTIONS:    Exercises/Interventions:     Therapeutic Ex (66299)   Min: Reps/Resistance Notes/CUES   Nu step seat 10 L2 x 6 min DOS  21   bike After rx for 6 min    Leg press 75# # 2 x 20    Heel slide 2 min, assist to end range    Ext stretch , seated hams3 x 30 sec     laq/saq 2 # 30 x each    Qs with towel 5 sec x 10    clams     Hip flex stretch off table     Prone flex  30 x 3, x 20 with assist to end range    incline 3 x 30 sec     Hamstring stretch on steps 60 x 2                        Manual Intervention (61501)  Min:30      Mfr to entire leg, gastroc,hams prom, patellar mob gr 3 all directions, very tight in hams, prom all ranges with over pressure    NMR re-education (70061)  Min:     wobble X 2 min ea    Semi squat X 20    Step tap/ecc     Step up 4\" x 30                                       Therapeutic Activity (94496)  Min:          Gait Training (74584)  Min:           Modalities  Min:            Other Therapeutic Activities:   Patient was thoroughly educated on this date regarding prehabilitation goals, importance of PT sessions in improving overall ROM, strength and stability prior to surgery, and how prehabilitation will facilitate improved post-operative outcomes. The patient was educated on and instructed in HEP as listed. The patient was given a detailed handout for exercises to initiate in the hospital post-operatively as well as at home. The discharge plan from the hospital was reviewed with the patient; specifically, to reduce length of hospital stay and to minimize time before reinitiating outpatient physical therapy after surgery. Education regarding early mobility post-operatively in the hospital and emphasis on working with both physical therapy and nursing staff to achieve ambulation goal was provided. The patient was highly encouraged to attend joint class in hospital prior to surgery for further instructions on pre and post-surgical care. Also, education regarding goals and expectations was provided; specifically, knee flexion range of motion greater than or equal to 90 degrees and 0 degrees knee extension to promote improved gait mechanics and ambulation. The patient was encouraged to utilize ice/cold pack after surgery to address pain, minimize swelling as often as possible.  It is in my medical opinion that this patient is clear from all physical barriers prior to consideration for surgery, activity modifications prior to and post operatively have been discussed with this patient as well as discharge planning and is cleared for surgery from physical therapy perspective. Home Exercise Program:  Patient instructed in the above exercises for HEP. Patient verbalized/demonstrated understanding and was issued written handout  Seated hamstring Stretch 30 sec x 5  Seated Calf Stretch           30 sec x 5  Seated Flexion Stretch      30 sec x 5  Seated Heel Slide  x30  Long Arc Quad      x 30  Quad Set              x 30  Prone Flexion       x 20  Straight Leg Raise   x 20  Glut Set                  x 20  Ankle Pump          x 20  Semi Squat         x 20  . Therapeutic Exercise and NMR EXR  [] (20668) Provided verbal/tactile cueing for activities related to strengthening, flexibility, endurance, ROM for improvements in LE, proximal hip, and core control with self care, mobility, lifting, ambulation.  [] (02118) Provided verbal/tactile cueing for activities related to improving balance, coordination, kinesthetic sense, posture, motor skill, proprioception  to assist with LE, proximal hip, and core control in self care, mobility, lifting, ambulation and eccentric single leg control.  [address] and Therapeutic Activities:    [] (56283 or 73199) Provided verbal/tactile cueing for activities related to improving balance, coordination, kinesthetic sense, posture, motor skill, proprioception and motor activation to allow for proper function of core, proximal hip and LE with self care and ADLs and functional mobility.   [] (27184) Gait Re-education- Provided training and instruction to the patient for proper LE, core and proximal hip recruitment and positioning and eccentric body weight control with ambulation re-education including up and down stairs     Gait Training:  [] (94847) Provided training and instruction to the patient for proper postural muscle recruitment and positioning with ambulation re-education     Home Exercise Program:    [x] (13882) Reviewed/Progressed HEP activities related to strengthening, flexibility, endurance, ROM of core, proximal hip and LE for functional self-care, mobility, lifting and ambulation/stair navigation   [] (14966)Reviewed/Progressed HEP activities related to improving balance, coordination, kinesthetic sense, posture, motor skill, proprioception of core, proximal hip and LE for self care, mobility, lifting, and ambulation/stair navigation      Manual Treatments:  PROM / STM / Oscillations-Mobs:  G-I, II, III, IV (PA's, Inf., Post.)  [] (48709) Provided manual therapy to mobilize LE, proximal hip and/or LS spine soft tissue/joints for the purpose of modulating pain, promoting relaxation,  increasing ROM, reducing/eliminating soft tissue swelling/inflammation/restriction, improving soft tissue extensibility and allowing for proper ROM for normal function with self care, mobility, lifting and ambulation.      Charges:  Timed Code Treatment Minutes: 60   Total Treatment Minutes: 60      [] EVAL (LOW) 52904 (typically 20 minutes face-to-face)  [] EVAL (MOD) 52023 (typically 30 minutes face-to-face)  [] EVAL (HIGH) 12064 (typically 45 minutes face-to-face)  [] RE-EVAL     [x] AP(55165) x  2   [] Dry needle 1 or 2 Muscles (91627)  [] NMR (43045) x     [] Dry needle 3+ Muscles (47210)  [x] Manual (93377) x 2 [] Ultrasound (74701) x  [] TA (61701) x     [] Mech Traction (61832)  [] ES(attended) (01689)     [] ES (un) (56436):   [] Vasopump (11563) [] Ionto (20231)   [] Gait (71297)  [] Other:        ASSESSMENT:  See eval    Treatment/Activity Tolerance:  [x] Patient tolerated treatment well [] Patient limited by fatique  [] Patient limited by pain  [] Patient limited by other medical complications  [] Other:     Overall Progression Towards Functional goals/ Treatment Progress Update:  [x] Patient is progressing as expected towards functional goals listed. [] Progression is slowed due to complexities/Impairments listed. [] Progression has been slowed due to co-morbidities. [] Plan just implemented, too soon to assess goals progression <30days   [] Goals require adjustment due to lack of progress  [] Patient is not progressing as expected and requires additional follow up with physician  [] Other    Prognosis for POC: [x] Good [] Fair  [] Poor    Patient requires continued skilled intervention: [] Yes  [x] No  GOALS:  Patient stated goal:\" I want to learn exercises to help with surgery \"  []? Progressing: []? Met: []? Not Met: []? Adjusted     Therapist goals for Patient:   Short Term Goals: To be achieved in:   Goals-  Pt will have 120 flex, 0 ext to help him reach is [id number]  Pt will have 5-/5 strength to help surinder  reach his goals  Pt will ambualte with a straight cane to help her meet his goals             PLAN   LE arom, prom  strength, proprioception, gait, balance, functional activities. Mfr, joint mobs, mods as needed, hep. Progress as tolerated 1-3x wk x 6-8 wks         [x] Continue per plan of care [] Alter current plan (see comments)  [] Plan of care initiated [] Hold pending MD visit [x] Discharge    Electronically signed by: [place] [id number]  Note: If patient does not return for scheduled/recommended follow up visits, this note will serve as a discharge from care along with the most recent update on progress.

## 2021-03-26 ENCOUNTER — HOSPITAL ENCOUNTER (OUTPATIENT)
Dept: PHYSICAL THERAPY | Age: 66
Setting detail: THERAPIES SERIES
Discharge: HOME | End: 2021-03-26
Payer: MEDICARE

## 2021-03-26 PROCEDURE — 97110 THERAPEUTIC EXERCISES: CPT

## 2021-03-26 PROCEDURE — 97112 NEUROMUSCULAR REEDUCATION: CPT

## 2021-03-26 PROCEDURE — 97140 MANUAL THERAPY 1/> REGIONS: CPT

## 2021-03-26 NOTE — FLOWSHEET NOTE
PLAN  OF CARE  Date:  3/26/2021    Patient Name:  Og Benedict    :  1955  MRN: [id number]    Pertinent Medical History:Additional Pertinent Hx: cancer    Medical/Treatment Diagnosis Information:  Status post total left knee replacement    · Treatment Diagnosis: decreased abilty to ambulate and function    Insurance/Certification information:  PT Insurance Information: [facility] Medicare  Physician Information:  Referring Practitioner: Dr Vanessa Fisher of care signed (Y/N): routed        Visit # POC/Insurance Allowable Auth Needed   5  7 Per insurance []Yes   []No     Latex Allergy:  [x]NO      []YES  Preferred Language for Healthcare:   [x]English       []Other:    Functional Scale:       Date assessed: at al  [facility]- 72    Pain level:  0/10     History of Injury: Pt is a [age]y/o female with a hx of left knee pain with a resultant left tka on 21. She was seen at home for a few weeks and comes to start PT. She c/o constant aching pain which is worst in the am or after walking a lot. She is walking with a cane at this time. She can't do steps normal.  She hopes to return to gardening and walking  normal.     SUBJECTIVE:  Pt states, \" Im ready for surgery \"  21 Patient reports knee is doing ok,just stiffness. 3/24/21  Pt states, \" still having some spasms at night \"  3/26/21 Pt reports no pain just stiffness in her knee.      OBJECTIVE: see eval   Test measurements:    initial 3/12/21 3/16/21 3/18/21     Flex 90 100 103     ext -10 -8 -5     Strength- 3/                                                                                                         RESTRICTIONS/PRECAUTIONS:    Exercises/Interventions:     Therapeutic Ex (46217)   Min: Reps/Resistance Notes/CUES   Nu step seat 10 L2 x 6 min DOS  21   bike After rx for 6 min    Leg press 75# # 2 x 20    Heel slide 2 min, assist to end range    Ext stretch , seated hams3 x 30 sec     laq/saq 3# 30 x each    Qs with towel 5 sec x 10    clams     Hip flex stretch off table     Prone flex  30 x 3, x 20 with assist to end range    incline 3 x 30 sec     Hamstring stretch on steps 60 x 2    QSS 30\" x 3                    Manual Intervention (49246)  Min:20      Mfr to entire leg, gastroc,hams prom, patellar mob gr 3 all directions, very tight in hams, prom all ranges with over pressure    NMR re-education (51673)  Min:     wobble X 3 min ea    Semi squat X 20    Step tap/ecc 2 min    Step up 4\" x 30                                       Therapeutic Activity (03946)  Min:          Gait Training (38333)  Min:           Modalities  Min:            Other Therapeutic Activities:   Patient was thoroughly educated on this date regarding prehabilitation goals, importance of PT sessions in improving overall ROM, strength and stability prior to surgery, and how prehabilitation will facilitate improved post-operative outcomes. The patient was educated on and instructed in HEP as listed. The patient was given a detailed handout for exercises to initiate in the hospital post-operatively as well as at home. The discharge plan from the hospital was reviewed with the patient; specifically, to reduce length of hospital stay and to minimize time before reinitiating outpatient physical therapy after surgery. Education regarding early mobility post-operatively in the hospital and emphasis on working with both physical therapy and nursing staff to achieve ambulation goal was provided. The patient was highly encouraged to attend joint class in hospital prior to surgery for further instructions on pre and post-surgical care. Also, education regarding goals and expectations was provided; specifically, knee flexion range of motion greater than or equal to 90 degrees and 0 degrees knee extension to promote improved gait mechanics and ambulation.  The patient was encouraged to utilize ice/cold pack after surgery to and down stairs     Gait Training:  [] (88761) Provided training and instruction to the patient for proper postural muscle recruitment and positioning with ambulation re-education     Home Exercise Program:    [x] (76049) Reviewed/Progressed HEP activities related to strengthening, flexibility, endurance, ROM of core, proximal hip and LE for functional self-care, mobility, lifting and ambulation/stair navigation   [] (10103)Reviewed/Progressed HEP activities related to improving balance, coordination, kinesthetic sense, posture, motor skill, proprioception of core, proximal hip and LE for self care, mobility, lifting, and ambulation/stair navigation      Manual Treatments:  PROM / STM / Oscillations-Mobs:  G-I, II, III, IV (PA's, Inf., Post.)  [] (17246) Provided manual therapy to mobilize LE, proximal hip and/or LS spine soft tissue/joints for the purpose of modulating pain, promoting relaxation,  increasing ROM, reducing/eliminating soft tissue swelling/inflammation/restriction, improving soft tissue extensibility and allowing for proper ROM for normal function with self care, mobility, lifting and ambulation.      Charges:  Timed Code Treatment Minutes: 60   Total Treatment Minutes: 60      [] EVAL (LOW) 27027 (typically 20 minutes face-to-face)  [] EVAL (MOD) 72498 (typically 30 minutes face-to-face)  [] EVAL (HIGH) 11379 (typically 45 minutes face-to-face)  [] RE-EVAL     [x] KP(93733) x  2   [] Dry needle 1 or 2 Muscles (67767)  [x] NMR (47185) x     [] Dry needle 3+ Muscles (57441)  [x] Manual (13376) x   [] Ultrasound (50268) x  [] TA (64029) x     [] Mech Traction (65163)  [] ES(attended) (83332)     [] ES (un) (71220):   [] Vasopump (52548) [] Ionto (62599)   [] Gait (52378)  [] Other:        ASSESSMENT:  See eval    Treatment/Activity Tolerance:  [x] Patient tolerated treatment well [] Patient limited by fatique  [] Patient limited by pain  [] Patient limited by other medical complications  [] Other:

## 2021-03-29 ENCOUNTER — HOSPITAL ENCOUNTER (OUTPATIENT)
Dept: PHYSICAL THERAPY | Age: 66
Setting detail: THERAPIES SERIES
Discharge: HOME | End: 2021-03-29
Payer: MEDICARE

## 2021-03-29 PROCEDURE — 97110 THERAPEUTIC EXERCISES: CPT

## 2021-03-29 PROCEDURE — 97140 MANUAL THERAPY 1/> REGIONS: CPT

## 2021-03-29 PROCEDURE — 97112 NEUROMUSCULAR REEDUCATION: CPT

## 2021-03-29 NOTE — FLOWSHEET NOTE
PLAN  OF CARE  Date:  3/29/2021    Patient Name:  Preston Cowan    :  1955  MRN: [id number]    Pertinent Medical History:Additional Pertinent Hx: cancer    Medical/Treatment Diagnosis Information:  Status post total left knee replacement    · Treatment Diagnosis: decreased abilty to ambulate and function    Insurance/Certification information:  PT Insurance Information: [facility] Medicare  Physician Information:  Referring Practitioner: Dr Vanessa Fisher of care signed (Y/N): routed        Visit # POC/Insurance Allowable Auth Needed   6  7 Per insurance []Yes   []No     Latex Allergy:  [x]NO      []YES  Preferred Language for Healthcare:   [x]English       []Other:    Functional Scale:       Date assessed: at al  [facility]- 72    Pain level:  0/10     History of Injury: Pt is a [age]y/o female with a hx of left knee pain with a resultant left tka on 21. She was seen at home for a few weeks and comes to start PT. She c/o constant aching pain which is worst in the am or after walking a lot. She is walking with a cane at this time. She can't do steps normal.  She hopes to return to gardening and walking  normal.     SUBJECTIVE:  Pt states, \" Im ready for surgery \"  21 Patient reports knee is doing ok,just stiffness. 3/24/21  Pt states, \" still having some spasms at night \"  3/26/21 Pt reports no pain just stiffness in her knee. 21 Patient reports spasms in her left  knee decreasing,she is able to do more walking without knee getting stiff.      OBJECTIVE: see eval   Test measurements:    initial 3/12/21 3/16/21 3/18/21     Flex 90 100 103     ext -10 -8 -5     Strength- 3/5                                                                                                         RESTRICTIONS/PRECAUTIONS:    Exercises/Interventions:     Therapeutic Ex (70889)   Min: Reps/Resistance Notes/CUES   Nu step seat 10 L2 x 6 min DOS  21   bike After rx for 6 min    Leg press 75# # 2 x 20    Heel slide 2 min, assist to end range    Ext stretch , seated hams3 x 30 sec     laq/saq 3# 30 x each    Qs with towel 5 sec x 10    clams     Hip flex stretch off table     Prone flex  30 x 3, x 20 with assist to end range    incline 3 x 30 sec     Hamstring stretch on steps 60 x 2    QSS 30\" x 3                    Manual Intervention (18212)  Min:20      Mfr to entire leg, gastroc,hams prom, patellar mob gr 3 all directions, very tight in hams, prom all ranges with over pressure    NMR re-education (74842)  Min:     wobble X 3 min ea    Semi squat X 20    Step tap/ecc 2 min    Step up 4\" x 30                                       Therapeutic Activity (21654)  Min:          Gait Training (87311)  Min:           Modalities  Min:            Other Therapeutic Activities:   Patient was thoroughly educated on this date regarding prehabilitation goals, importance of PT sessions in improving overall ROM, strength and stability prior to surgery, and how prehabilitation will facilitate improved post-operative outcomes. The patient was educated on and instructed in HEP as listed. The patient was given a detailed handout for exercises to initiate in the hospital post-operatively as well as at home. The discharge plan from the hospital was reviewed with the patient; specifically, to reduce length of hospital stay and to minimize time before reinitiating outpatient physical therapy after surgery. Education regarding early mobility post-operatively in the hospital and emphasis on working with both physical therapy and nursing staff to achieve ambulation goal was provided. The patient was highly encouraged to attend joint class in hospital prior to surgery for further instructions on pre and post-surgical care.  Also, education regarding goals and expectations was provided; specifically, knee flexion range of motion greater than or equal to 90 degrees and 0 degrees knee extension to promote improved gait mechanics and ambulation. The patient was encouraged to utilize ice/cold pack after surgery to address pain, minimize swelling as often as possible. It is in my medical opinion that this patient is clear from all physical barriers prior to consideration for surgery, activity modifications prior to and post operatively have been discussed with this patient as well as discharge planning and is cleared for surgery from physical therapy perspective. Home Exercise Program:  Patient instructed in the above exercises for HEP. Patient verbalized/demonstrated understanding and was issued written handout  Seated hamstring Stretch 30 sec x 5  Seated Calf Stretch           30 sec x 5  Seated Flexion Stretch      30 sec x 5  Seated Heel Slide  x30  Long Arc Quad      x 30  Quad Set              x 30  Prone Flexion       x 20  Straight Leg Raise   x 20  Glut Set                  x 20  Ankle Pump          x 20  Semi Squat         x 20  . Therapeutic Exercise and NMR EXR  [] (76526) Provided verbal/tactile cueing for activities related to strengthening, flexibility, endurance, ROM for improvements in LE, proximal hip, and core control with self care, mobility, lifting, ambulation.  [] (68688) Provided verbal/tactile cueing for activities related to improving balance, coordination, kinesthetic sense, posture, motor skill, proprioception  to assist with LE, proximal hip, and core control in self care, mobility, lifting, ambulation and eccentric single leg control.  [address] and Therapeutic Activities:    [] (27642 or 66936) Provided verbal/tactile cueing for activities related to improving balance, coordination, kinesthetic sense, posture, motor skill, proprioception and motor activation to allow for proper function of core, proximal hip and LE with self care and ADLs and functional mobility.   [] (26847) Gait Re-education- Provided training and instruction to the patient for proper LE, core and proximal hip recruitment and positioning and eccentric body weight control with ambulation re-education including up and down stairs     Gait Training:  [] (58089) Provided training and instruction to the patient for proper postural muscle recruitment and positioning with ambulation re-education     Home Exercise Program:    [x] (67668) Reviewed/Progressed HEP activities related to strengthening, flexibility, endurance, ROM of core, proximal hip and LE for functional self-care, mobility, lifting and ambulation/stair navigation   [] (79753)Reviewed/Progressed HEP activities related to improving balance, coordination, kinesthetic sense, posture, motor skill, proprioception of core, proximal hip and LE for self care, mobility, lifting, and ambulation/stair navigation      Manual Treatments:  PROM / STM / Oscillations-Mobs:  G-I, II, III, IV (PA's, Inf., Post.)  [] (77330) Provided manual therapy to mobilize LE, proximal hip and/or LS spine soft tissue/joints for the purpose of modulating pain, promoting relaxation,  increasing ROM, reducing/eliminating soft tissue swelling/inflammation/restriction, improving soft tissue extensibility and allowing for proper ROM for normal function with self care, mobility, lifting and ambulation.      Charges:  Timed Code Treatment Minutes: 60   Total Treatment Minutes: 60      [] EVAL (LOW) 31534 (typically 20 minutes face-to-face)  [] EVAL (MOD) 12276 (typically 30 minutes face-to-face)  [] EVAL (HIGH) 27119 (typically 45 minutes face-to-face)  [] RE-EVAL     [x] WA(90301) x  2   [] Dry needle 1 or 2 Muscles (91791)  [x] NMR (98012) x     [] Dry needle 3+ Muscles (12098)  [x] Manual (23799) x   [] Ultrasound (87392) x  [] TA (66254) x     [] Mech Traction (67033)  [] ES(attended) (10014)     [] ES (un) (40734):   [] Vasopump (96518) [] Ionto (47391)   [] Gait (50679)  [] Other:        ASSESSMENT:  See eval    Treatment/Activity Tolerance:  [x] Patient tolerated treatment well []

## 2021-04-02 ENCOUNTER — HOSPITAL ENCOUNTER (OUTPATIENT)
Dept: PHYSICAL THERAPY | Age: 66
Setting detail: THERAPIES SERIES
Discharge: HOME | End: 2021-04-02
Payer: MEDICARE

## 2021-04-02 PROCEDURE — 97140 MANUAL THERAPY 1/> REGIONS: CPT

## 2021-04-02 PROCEDURE — 97110 THERAPEUTIC EXERCISES: CPT

## 2021-04-02 PROCEDURE — 97112 NEUROMUSCULAR REEDUCATION: CPT

## 2021-04-02 NOTE — FLOWSHEET NOTE
PLAN  OF CARE  Date:  2021    Patient Name:  Stephen Williamson    :  1955  MRN: [id number]    Pertinent Medical History:Additional Pertinent Hx: cancer    Medical/Treatment Diagnosis Information:  Status post total left knee replacement    · Treatment Diagnosis: decreased abilty to ambulate and function    Insurance/Certification information:  PT Insurance Information: [facility] Medicare  Physician Information:  Referring Practitioner: Dr Guilford Ahumada of care signed (Y/N): routed        Visit # POC/Insurance Allowable Auth Needed   7 14 Per insurance by  []Yes   []No     Latex Allergy:  [x]NO      []YES  Preferred Language for Healthcare:   [x]English       []Other:    Functional Scale:       Date assessed: at al  womac- 72    Pain level:  0/10     History of Injury: Pt is a [age]y/o female with a hx of left knee pain with a resultant left tka on 21. She was seen at home for a few weeks and comes to start PT. She c/o constant aching pain which is worst in the am or after walking a lot. She is walking with a cane at this time. She can't do steps normal.  She hopes to return to gardening and walking  normal.     SUBJECTIVE:  Pt states, \" Im ready for surgery \"  21 Patient reports knee is doing ok,just stiffness. 3/24/21  Pt states, \" still having some spasms at night \"  3/26/21 Pt reports no pain just stiffness in her knee. 21 Patient reports spasms in her left  knee decreasing,she is able to do more walking without knee getting stiff. 21  Pt states, \" Improving, going some without the cane.   Not able to do steps normal yet or walk very far \"    OBJECTIVE: see eval   Test measurements:    initial 3/12/21 3/16/21 3/18/21 4/2/21    Flex 90 100 103 115    ext -10 -8 -5 -3    Strength- 3/5   3+/5 pre and post-surgical care. Also, education regarding goals and expectations was provided; specifically, knee flexion range of motion greater than or equal to 90 degrees and 0 degrees knee extension to promote improved gait mechanics and ambulation. The patient was encouraged to utilize ice/cold pack after surgery to address pain, minimize swelling as often as possible. It is in my medical opinion that this patient is clear from all physical barriers prior to consideration for surgery, activity modifications prior to and post operatively have been discussed with this patient as well as discharge planning and is cleared for surgery from physical therapy perspective. Home Exercise Program:  Patient instructed in the above exercises for HEP. Patient verbalized/demonstrated understanding and was issued written handout  Seated hamstring Stretch 30 sec x 5  Seated Calf Stretch           30 sec x 5  Seated Flexion Stretch      30 sec x 5  Seated Heel Slide  x30  Long Arc Quad      x 30  Quad Set              x 30  Prone Flexion       x 20  Straight Leg Raise   x 20  Glut Set                  x 20  Ankle Pump          x 20  Semi Squat         x 20  . Therapeutic Exercise and NMR EXR  [] (97842) Provided verbal/tactile cueing for activities related to strengthening, flexibility, endurance, ROM for improvements in LE, proximal hip, and core control with self care, mobility, lifting, ambulation.  [] (62368) Provided verbal/tactile cueing for activities related to improving balance, coordination, kinesthetic sense, posture, motor skill, proprioception  to assist with LE, proximal hip, and core control in self care, mobility, lifting, ambulation and eccentric single leg control.  [address] and Therapeutic Activities:    [] (39048 or 05629) Provided verbal/tactile cueing for activities related to improving balance, coordination, kinesthetic sense, posture, motor skill, proprioception and motor activation to allow for proper function of core, proximal hip and LE with self care and ADLs and functional mobility.   [] (52622) Gait Re-education- Provided training and instruction to the patient for proper LE, core and proximal hip recruitment and positioning and eccentric body weight control with ambulation re-education including up and down stairs     Gait Training:  [] (69410) Provided training and instruction to the patient for proper postural muscle recruitment and positioning with ambulation re-education     Home Exercise Program:    [x] (71430) Reviewed/Progressed HEP activities related to strengthening, flexibility, endurance, ROM of core, proximal hip and LE for functional self-care, mobility, lifting and ambulation/stair navigation   [] (15897)Reviewed/Progressed HEP activities related to improving balance, coordination, kinesthetic sense, posture, motor skill, proprioception of core, proximal hip and LE for self care, mobility, lifting, and ambulation/stair navigation      Manual Treatments:  PROM / STM / Oscillations-Mobs:  G-I, II, III, IV (PA's, Inf., Post.)  [] (15079) Provided manual therapy to mobilize LE, proximal hip and/or LS spine soft tissue/joints for the purpose of modulating pain, promoting relaxation,  increasing ROM, reducing/eliminating soft tissue swelling/inflammation/restriction, improving soft tissue extensibility and allowing for proper ROM for normal function with self care, mobility, lifting and ambulation.      Charges:  Timed Code Treatment Minutes: 60   Total Treatment Minutes: 60      [] EVAL (LOW) 77378 (typically 20 minutes face-to-face)  [] EVAL (MOD) 54564 (typically 30 minutes face-to-face)  [] EVAL (HIGH) 24093 (typically 45 minutes face-to-face)  [] RE-EVAL     [x] QJ(96395) x  2   [] Dry needle 1 or 2 Muscles (41889)  [x] NMR (28428) x     [] Dry needle 3+ Muscles (81239)  [x] Manual (24856) x   [] Ultrasound (52430) x  [] TA (14297) x     [] Mech Traction (70317)  [] ES(attended) (17707)     [] ES (un) (13036):   [] Vasopump (09366) [] Ionto (40254)   [] Gait (50504)  [] Other:        ASSESSMENT:  See eval    Treatment/Activity Tolerance:  [x] Patient tolerated treatment well [] Patient limited by fatique  [] Patient limited by pain  [] Patient limited by other medical complications  [] Other:     Overall Progression Towards Functional goals/ Treatment Progress Update:  [x] Patient is progressing as expected towards functional goals listed. [] Progression is slowed due to complexities/Impairments listed. [] Progression has been slowed due to co-morbidities. [] Plan just implemented, too soon to assess goals progression <30days   [] Goals require adjustment due to lack of progress  [] Patient is not progressing as expected and requires additional follow up with physician  [] Other    Prognosis for POC: [x] Good [] Fair  [] Poor    Patient requires continued skilled intervention: [] Yes  [x] No  GOALS:  Patient stated goal:\" I want to learn exercises to help with surgery \"  []? Progressing: []? Met: []? Not Met: []? Adjusted     Therapist goals for Patient:   Short Term Goals: To be achieved in:   Goals-  Pt will have 120 flex, 0 ext to help him reach is [id number]  Pt will have 5-/5 strength to help surinder  reach his goals  Pt will ambualte with a straight cane to help her meet his goals             PLAN   LE arom, prom  strength, proprioception, gait, balance, functional activities. Mfr, joint mobs, mods as needed, hep. Progress as tolerated 1-3x wk x 6-8 wks         [x] Continue per plan of care [] Alter current plan (see comments)  [] Plan of care initiated [] Hold pending MD visit [x] Discharge    Electronically signed by: Anel Lopez, PT   Note: If patient does not return for scheduled/recommended follow up visits, this note will serve as a discharge from care along with the most recent update on progress.

## 2021-04-03 ENCOUNTER — IMMUNIZATION (OUTPATIENT)
Dept: PRIMARY CARE CLINIC | Age: 66
End: 2021-04-03
Payer: MEDICARE

## 2021-04-05 ENCOUNTER — OFFICE VISIT (OUTPATIENT)
Dept: ORTHOPEDIC SURGERY | Age: 66
End: 2021-04-05

## 2021-04-05 VITALS — TEMPERATURE: 97.9 F

## 2021-04-05 DIAGNOSIS — Z96.652 STATUS POST TOTAL LEFT KNEE REPLACEMENT: Primary | ICD-10-CM

## 2021-04-05 PROCEDURE — 99024 POSTOP FOLLOW-UP VISIT: CPT | Performed by: ORTHOPAEDIC SURGERY

## 2021-04-05 RX ORDER — AMOXICILLIN 500 MG/1
CAPSULE ORAL
Qty: 4 CAPSULE | Refills: 1 | Status: SHIPPED | OUTPATIENT
Start: 2021-04-05

## 2021-04-05 NOTE — PROGRESS NOTES
[facility]  Office Visit    Chief Complaint: Follow-up s/p left total knee arthroplasty    HPI:  Violet Hayes is a 77 y.o. who is here in follow-up of left total knee arthroplasty performed on February 23, 2021. She feels that she is improving recently. She continues to work with physical therapy and is making excellent progress in range of motion. She walks with a cane. Patient Active Problem List   Diagnosis    Osteoarthritis of knee, unspecified       ROS:  Constitutional: denies fever, chills, weight loss  MSK: denies pain in other joints, muscle aches  Neurological: denies numbness, tingling, weakness    Exam:  Temperature 97.9 °F (36.6 °C), temperature source Temporal.    Appearance: sitting in exam room chair, appears to be in no acute distress, awake and alert  Resp: unlabored breathing on room air  Skin: warm, dry and intact with out erythema or significant increased temperature  Neuro: grossly intact both lower extremities. Intact sensation to light touch. Motor exam 4+ to 5/5 in all major motor groups. Left knee: Incision is healed. Range of motion is 0-120 degrees. Sensation is intact light touch. There is brisk capillary refill. Dorsalis pedis and posterior tibial pulses are intact. There is 5/5 muscle strength in all muscle groups. Imaging:  None    Assessment:  S/p left total knee arthroplasty    Plan:  She is doing well recovering from left knee replacement. She will continue with physical therapy and home exercises. Amoxicillin was prescribed for an upcoming dental procedure. We discussed with the patient today the use of antibiotic prophylaxis prior to any dental procedures. We discussed that the antibiotic prophylaxis would be used to help prevent periprosthetic joint infections. If they were not offered antibiotics by the physician performing the procedure, they should contact our office that we may prescribe them antibiotics.  Follow-up in 6 weeks for x-rays and assessment of healing. This dictation was done with Dragon dictation and may contain mechanical errors related to translation.

## 2021-04-06 PROCEDURE — 91301 COVID-19, MODERNA VACCINE 100MCG/0.5ML DOSE: CPT

## 2021-04-06 PROCEDURE — 0011A COVID-19, MODERNA VACCINE 100MCG/0.5ML DOSE: CPT

## 2021-04-09 ENCOUNTER — HOSPITAL ENCOUNTER (OUTPATIENT)
Dept: PHYSICAL THERAPY | Age: 66
Setting detail: THERAPIES SERIES
Discharge: HOME | End: 2021-04-09
Payer: MEDICARE

## 2021-04-09 PROCEDURE — 97110 THERAPEUTIC EXERCISES: CPT

## 2021-04-09 PROCEDURE — 97112 NEUROMUSCULAR REEDUCATION: CPT

## 2021-04-09 PROCEDURE — 97140 MANUAL THERAPY 1/> REGIONS: CPT

## 2021-04-09 NOTE — FLOWSHEET NOTE
PLAN  OF CARE  Date:  2021    Patient Name:  Hamida Arreola    :  1955  MRN: [id number]    Pertinent Medical History:Additional Pertinent Hx: cancer    Medical/Treatment Diagnosis Information:  Status post total left knee replacement    · Treatment Diagnosis: decreased abilty to ambulate and function    Insurance/Certification information:  PT Insurance Information: [facility] Medicare  Physician Information:  Referring Practitioner: Dr Shadia Barron of care signed (Y/N): routed        Visit # POC/Insurance Allowable Auth Needed   8 14 Per insurance by  []Yes   []No     Latex Allergy:  [x]NO      []YES  Preferred Language for Healthcare:   [x]English       []Other:    Functional Scale:       Date assessed: at eval  womac- 72    Pain level:  0/10     History of Injury: Pt is a [age]y/o female with a hx of left knee pain with a resultant left tka on 21. She was seen at home for a few weeks and comes to start PT. She c/o constant aching pain which is worst in the am or after walking a lot. She is walking with a cane at this time. She can't do steps normal.  She hopes to return to gardening and walking  normal.     SUBJECTIVE:  Pt states, \" Im ready for surgery \"  21 Patient reports knee is doing ok,just stiffness. 3/24/21  Pt states, \" still having some spasms at night \"  3/26/21 Pt reports no pain just stiffness in her knee. 21 Patient reports spasms in her left  knee decreasing,she is able to do more walking without knee getting stiff. 21  Pt states, \" Improving, going some without the cane. Not able to do steps normal yet or walk very far \"  21 Patient reports knee is improving,walking wihout the cane more.     OBJECTIVE: see eval   Test measurements:    initial 3/12/21 3/16/21 3/18/21 4/2/21    Flex 90 100 103 115    ext -10 -8 -5 -3    Strength- 3/   3+/5 RESTRICTIONS/PRECAUTIONS:    Exercises/Interventions:     Therapeutic Ex (89794)   Min: Reps/Resistance Notes/CUES   Nu step seat 10 L2 x 6 min DOS  2/23/21   bike After rx for 6 min    Leg press 85# # 2 x 20    Heel slide 2 min, assist to end range    Ext stretch , seated hams3 x 30 sec          Qs with towel 5 sec x 10    clams     Hip flex stretch off table     Prone flex  30 x 3, x 20 with assist to end range    incline 3 x 30 sec     Hamstring stretch on steps 60 x 2    QSS 30\" x 3                    Manual Intervention (67231)  Min:20      Mfr to entire leg, gastroc,hams prom, patellar mob gr 3 all directions, very tight in hams, prom all ranges with over pressure    NMR re-education (35269)  Min:     wobble X 3 min ea    Semi squat X 20    Step tap/ecc 6\" x 30    Step up 4\" x 30    bosu     pilates press 5 sp x 30, left 2sp x 30    sls X 2 min                        Therapeutic Activity (50917)  Min:          Gait Training (45901)  Min:           Modalities  Min:            Other Therapeutic Activities:   Patient was thoroughly educated on this date regarding prehabilitation goals, importance of PT sessions in improving overall ROM, strength and stability prior to surgery, and how prehabilitation will facilitate improved post-operative outcomes. The patient was educated on and instructed in HEP as listed. The patient was given a detailed handout for exercises to initiate in the hospital post-operatively as well as at home. The discharge plan from the hospital was reviewed with the patient; specifically, to reduce length of hospital stay and to minimize time before reinitiating outpatient physical therapy after surgery. Education regarding early mobility post-operatively in the hospital and emphasis on working with both physical therapy and nursing staff to achieve ambulation goal was provided.  The patient was highly encouraged to attend joint class in hospital prior to surgery for further instructions on pre and post-surgical care. Also, education regarding goals and expectations was provided; specifically, knee flexion range of motion greater than or equal to 90 degrees and 0 degrees knee extension to promote improved gait mechanics and ambulation. The patient was encouraged to utilize ice/cold pack after surgery to address pain, minimize swelling as often as possible. It is in my medical opinion that this patient is clear from all physical barriers prior to consideration for surgery, activity modifications prior to and post operatively have been discussed with this patient as well as discharge planning and is cleared for surgery from physical therapy perspective. Home Exercise Program:  Patient instructed in the above exercises for HEP. Patient verbalized/demonstrated understanding and was issued written handout  Seated hamstring Stretch 30 sec x 5  Seated Calf Stretch           30 sec x 5  Seated Flexion Stretch      30 sec x 5  Seated Heel Slide  x30  Long Arc Quad      x 30  Quad Set              x 30  Prone Flexion       x 20  Straight Leg Raise   x 20  Glut Set                  x 20  Ankle Pump          x 20  Semi Squat         x 20  . Therapeutic Exercise and NMR EXR  [] (74156) Provided verbal/tactile cueing for activities related to strengthening, flexibility, endurance, ROM for improvements in LE, proximal hip, and core control with self care, mobility, lifting, ambulation.  [] (37887) Provided verbal/tactile cueing for activities related to improving balance, coordination, kinesthetic sense, posture, motor skill, proprioception  to assist with LE, proximal hip, and core control in self care, mobility, lifting, ambulation and eccentric single leg control.  [address] and Therapeutic Activities:    [] (69259 or 31653) Provided verbal/tactile cueing for activities related to improving balance, coordination, kinesthetic sense, posture, motor skill, proprioception and motor activation to allow for proper function of core, proximal hip and LE with self care and ADLs and functional mobility.   [] (79499) Gait Re-education- Provided training and instruction to the patient for proper LE, core and proximal hip recruitment and positioning and eccentric body weight control with ambulation re-education including up and down stairs     Gait Training:  [] (71503) Provided training and instruction to the patient for proper postural muscle recruitment and positioning with ambulation re-education     Home Exercise Program:    [x] (75608) Reviewed/Progressed HEP activities related to strengthening, flexibility, endurance, ROM of core, proximal hip and LE for functional self-care, mobility, lifting and ambulation/stair navigation   [] (13341)Reviewed/Progressed HEP activities related to improving balance, coordination, kinesthetic sense, posture, motor skill, proprioception of core, proximal hip and LE for self care, mobility, lifting, and ambulation/stair navigation      Manual Treatments:  PROM / STM / Oscillations-Mobs:  G-I, II, III, IV (PA's, Inf., Post.)  [] (40170) Provided manual therapy to mobilize LE, proximal hip and/or LS spine soft tissue/joints for the purpose of modulating pain, promoting relaxation,  increasing ROM, reducing/eliminating soft tissue swelling/inflammation/restriction, improving soft tissue extensibility and allowing for proper ROM for normal function with self care, mobility, lifting and ambulation.      Charges:  Timed Code Treatment Minutes: 60   Total Treatment Minutes: 60      [] EVAL (LOW) 44138 (typically 20 minutes face-to-face)  [] EVAL (MOD) 31235 (typically 30 minutes face-to-face)  [] EVAL (HIGH) 86342 (typically 45 minutes face-to-face)  [] RE-EVAL     [x] YP(64149) x  2   [] Dry needle 1 or 2 Muscles (23527)  [x] NMR (96957) x     [] Dry needle 3+ Muscles (15186)  [x] Manual (64006) x   [] Ultrasound (30539) x  [] TA (83222) x     [] [facility] Traction (23008)  [] ES(attended) (02812)     [] ES (un) (80174):   [] Vasopump (15448) [] Ionto (17158)   [] Gait (88105)  [] Other:        ASSESSMENT:  See eval    Treatment/Activity Tolerance:  [x] Patient tolerated treatment well [] Patient limited by fatique  [] Patient limited by pain  [] Patient limited by other medical complications  [] Other:     Overall Progression Towards Functional goals/ Treatment Progress Update:  [x] Patient is progressing as expected towards functional goals listed. [] Progression is slowed due to complexities/Impairments listed. [] Progression has been slowed due to co-morbidities. [] Plan just implemented, too soon to assess goals progression <30days   [] Goals require adjustment due to lack of progress  [] Patient is not progressing as expected and requires additional follow up with physician  [] Other    Prognosis for POC: [x] Good [] Fair  [] Poor    Patient requires continued skilled intervention: [] Yes  [x] No  GOALS:  Patient stated goal:\" I want to learn exercises to help with surgery \"  []? Progressing: []? Met: []? Not Met: []? Adjusted     Therapist goals for Patient:   Short Term Goals: To be achieved in:   Goals-  Pt will have 120 flex, 0 ext to help him reach is [id number]  Pt will have 5-/5 strength to help surinder  reach his goals  Pt will ambualte with a straight cane to help her meet his goals             PLAN   LE arom, prom  strength, proprioception, gait, balance, functional activities. Mfr, joint mobs, mods as needed, hep. Progress as tolerated 1-3x wk x 6-8 wks         [x] Continue per plan of care [] Alter current plan (see comments)  [] Plan of care initiated [] Hold pending MD visit [x] Discharge    Electronically signed by: Jazmín Posada PTA   Note: If patient does not return for scheduled/recommended follow up visits, this note will serve as a discharge from care along with the most recent update on progress.

## 2021-04-14 ENCOUNTER — HOSPITAL ENCOUNTER (OUTPATIENT)
Dept: PHYSICAL THERAPY | Age: 66
Setting detail: THERAPIES SERIES
Discharge: HOME | End: 2021-04-14
Payer: MEDICARE

## 2021-04-14 PROCEDURE — 97140 MANUAL THERAPY 1/> REGIONS: CPT

## 2021-04-14 PROCEDURE — 97110 THERAPEUTIC EXERCISES: CPT

## 2021-04-14 PROCEDURE — 97112 NEUROMUSCULAR REEDUCATION: CPT

## 2021-04-14 NOTE — FLOWSHEET NOTE
PLAN  OF CARE  Date:  2021    Patient Name:  Lobo Hawkins    :  1955  MRN: [id number]    Pertinent Medical History:Additional Pertinent Hx: cancer    Medical/Treatment Diagnosis Information:  Status post total left knee replacement    · Treatment Diagnosis: decreased abilty to ambulate and function    Insurance/Certification information:  PT Insurance Information: [facility] Medicare  Physician Information:  Referring Practitioner: Dr Myriam Casanova of care signed (Y/N): routed        Visit # POC/Insurance Allowable Auth Needed   9 14 Per insurance by  []Yes   []No     Latex Allergy:  [x]NO      []YES  Preferred Language for Healthcare:   [x]English       []Other:    Functional Scale:       Date assessed: at eval  womac- 72    Pain level:  0/10     History of Injury: Pt is a [age]y/o female with a hx of left knee pain with a resultant left tka on 21. She was seen at home for a few weeks and comes to start PT. She c/o constant aching pain which is worst in the am or after walking a lot. She is walking with a cane at this time. She can't do steps normal.  She hopes to return to gardening and walking  normal.     SUBJECTIVE:  Pt states, \" Im ready for surgery \"  21 Patient reports knee is doing ok,just stiffness. 3/24/21  Pt states, \" still having some spasms at night \"  3/26/21 Pt reports no pain just stiffness in her knee. 21 Patient reports spasms in her left  knee decreasing,she is able to do more walking without knee getting stiff. 21  Pt states, \" Improving, going some without the cane. Not able to do steps normal yet or walk very far \"  21 Patient reports knee is improving,walking wihout the cane more.   21  Pt states, \" stiff and a little sore \"    OBJECTIVE: see eval   Test measurements:    initial 3/12/21 3/16/21 3/18/21 4/2/21    Flex 90 100 103 115    ext -10 -8 -5 -3    Strength- 3/   3+/5 RESTRICTIONS/PRECAUTIONS:    Exercises/Interventions:     Therapeutic Ex (08682)   Min: Reps/Resistance Notes/CUES   Nu step seat 10 L2 x 6 min DOS  2/23/21   bike After rx for 6 min    Leg press 85# # 2 x 20, 60# x 40 left    Heel slide 2 min, assist to end range    Ext stretch , seated hams3 x 30 sec          Qs with towel 5 sec x 10    clams     Hip flex stretch off table     Prone flex  30 x 3, x 20 with assist to end range    incline 3 x 30 sec     Hamstring stretch on steps 60 x 2    QSS 30\" x 3                    Manual Intervention (35450)  Min:20      Mfr to entire leg, gastroc,hams prom, patellar mob gr 3 all directions, very tight in hams, prom all ranges with over pressure    NMR re-education (40659)  Min:     wobble X 3 min ea    Semi squat X 20    Step tap/ecc 6\" x 30    Step up 4\" x 30    bosu Ski x 2 min    pilates press 5 sp x 30, left 2sp x 30    sls X 2 min    Toe raises x 30     lunge X 30              Therapeutic Activity (85829)  Min:          Gait Training (29136)  Min:           Modalities  Min:            Other Therapeutic Activities:   Patient was thoroughly educated on this date regarding prehabilitation goals, importance of PT sessions in improving overall ROM, strength and stability prior to surgery, and how prehabilitation will facilitate improved post-operative outcomes. The patient was educated on and instructed in HEP as listed. The patient was given a detailed handout for exercises to initiate in the hospital post-operatively as well as at home. The discharge plan from the hospital was reviewed with the patient; specifically, to reduce length of hospital stay and to minimize time before reinitiating outpatient physical therapy after surgery.  Education regarding early mobility post-operatively in the hospital and emphasis on working with both physical therapy and nursing staff to achieve needle 3+ Muscles (33793)  [x] Manual (53659) x 1  [] Ultrasound (17656) x  [] TA (85260) x     [] Mech Traction (49677)  [] ES(attended) (33832)     [] ES (un) (77652):   [] Vasopump (43623) [] Ionto (20355)   [] Gait (87068)  [] Other:        ASSESSMENT:  See eval    Treatment/Activity Tolerance:  [x] Patient tolerated treatment well [] Patient limited by fatique  [] Patient limited by pain  [] Patient limited by other medical complications  [] Other:     Overall Progression Towards Functional goals/ Treatment Progress Update:  [x] Patient is progressing as expected towards functional goals listed. [] Progression is slowed due to complexities/Impairments listed. [] Progression has been slowed due to co-morbidities. [] Plan just implemented, too soon to assess goals progression <30days   [] Goals require adjustment due to lack of progress  [] Patient is not progressing as expected and requires additional follow up with physician  [] Other    Prognosis for POC: [x] Good [] Fair  [] Poor    Patient requires continued skilled intervention: [] Yes  [x] No  GOALS:  Patient stated goal:\" I want to learn exercises to help with surgery \"  []? Progressing: []? Met: []? Not Met: []? Adjusted     Therapist goals for Patient:   Short Term Goals: To be achieved in:   Goals-  Pt will have 120 flex, 0 ext to help him reach is [id number]  Pt will have 5-/5 strength to help surinder  reach his goals  Pt will ambualte with a straight cane to help her meet his goals             PLAN   LE arom, prom  strength, proprioception, gait, balance, functional activities. Mfr, joint mobs, mods as needed, hep.  Progress as tolerated 1-3x wk x 6-8 wks         [x] Continue per plan of care [] Alter current plan (see comments)  [] Plan of care initiated [] Hold pending MD visit [x] Discharge    Electronically signed by: Anel Lopez, PT   Note: If patient does not return for scheduled/recommended follow up visits, this note will serve as a discharge from care along with the most recent update on progress.

## 2021-04-16 ENCOUNTER — HOSPITAL ENCOUNTER (OUTPATIENT)
Dept: PHYSICAL THERAPY | Age: 66
Setting detail: THERAPIES SERIES
Discharge: HOME | End: 2021-04-16
Payer: MEDICARE

## 2021-04-16 PROCEDURE — 97110 THERAPEUTIC EXERCISES: CPT

## 2021-04-16 PROCEDURE — 97112 NEUROMUSCULAR REEDUCATION: CPT

## 2021-04-16 PROCEDURE — 97140 MANUAL THERAPY 1/> REGIONS: CPT

## 2021-04-16 NOTE — PLAN OF CARE
Physical Therapy Re-Certification Plan of Care/MD UPDATE      Dear  ,Johnna Blair    We had the pleasure of treating the following patient for physical therapy services at [facility]. A summary of our findings can be found in the updated assessment below. This includes our plan of care. If you have any questions or concerns regarding these findings, please do not hesitate to contact me at the office phone number checked above. Thank you for the referral.     Physician Signature:________________________________Date:__________________  By signing above (or electronic signature), therapists plan is approved by physician    Date Range Of Visits: 3/12-21  Total Visits to Date:10  Overall Response to Treatment:  rom-120, -0   [x]Patient is responding well to treatment and improvement is noted with regards  to goals   []Patient should continue to improve in reasonable time if they continue HEP   []Patient has plateaued and is no longer responding to skilled PT intervention    []Patient is getting worse and would benefit from return to referring MD   []Patient unable to adhere to initial POC   []Other:       Recommendation:    [x]Continue PT2x / wk for 6weeks.     []Hold PT, pending MD visit                          PLAN  OF CARE  Date:  2021    Patient Name:  Jerry Sanabria    :  1955  MRN: [id number]    Pertinent Medical History:Additional Pertinent Hx: cancer    Medical/Treatment Diagnosis Information:  Status post total left knee replacement    · Treatment Diagnosis: decreased abilty to ambulate and function    Insurance/Certification information:  PT Insurance Information: [facility] Medicare  Physician Information:  Referring Practitioner: Dr Ann Camera of care signed (Y/N): routed        Visit # POC/Insurance Allowable Auth Needed   10 14 Per insurance by  []Yes   []No     Latex Allergy:  [x]NO      []YES  Preferred Language for Healthcare:   [x]English       []Other:    Functional Scale:       Date assessed: at [facility]  womac- 35    Pain level:  0/10     History of Injury: Pt is a [age]y/o female with a hx of left knee pain with a resultant left tka on 2/23/21. She was seen at home for a few weeks and comes to start PT. She c/o constant aching pain which is worst in the am or after walking a lot. She is walking with a cane at this time. She can't do steps normal.  She hopes to return to gardening and walking  normal.     SUBJECTIVE:  Pt states, \" Im ready for surgery \"  03/16/21 Patient reports knee is doing ok,just stiffness. 3/24/21  Pt states, \" still having some spasms at night \"  3/26/21 Pt reports no pain just stiffness in her knee. 03/29/21 Patient reports spasms in her left  knee decreasing,she is able to do more walking without knee getting stiff. 4/2/21  Pt states, \" Improving, going some without the cane. Not able to do steps normal yet or walk very far \"  04/09/21 Patient reports knee is improving,walking wihout the cane more.   4/14/21  Pt states, \" stiff and a little sore \"   4/16/21  Pt states, \" doing better \"    OBJECTIVE: see eval   Test measurements:    initial 3/12/21 3/16/21 3/18/21 4/2/21    Flex 90 100 103 115    ext -10 -8 -5 -3    Strength- 3/5   3+/5                                                                                                      RESTRICTIONS/PRECAUTIONS:    Exercises/Interventions:     Therapeutic Ex (67506)   Min: Reps/Resistance Notes/CUES   Nu step seat 10 L2 x 6 min DOS  2/23/21   bike After rx for 6 min    Leg press 85# # 2 x 20, 60# x 40 left    Heel slide 2 min, assist to end range    Ext stretch , seated hams3 x 30 sec          Qs with towel 5 sec x 10    clams     Hip flex stretch off table     Prone flex  30 x 3, x 20 with assist to end range    incline 3 x 30 sec     Hamstring stretch on steps 60 x 2    QSS 30\" x 3                    Manual Intervention from all physical barriers prior to consideration for surgery, activity modifications prior to and post operatively have been discussed with this patient as well as discharge planning and is cleared for surgery from physical therapy perspective. Home Exercise Program:  Patient instructed in the above exercises for HEP. Patient verbalized/demonstrated understanding and was issued written handout  Seated hamstring Stretch 30 sec x 5  Seated Calf Stretch           30 sec x 5  Seated Flexion Stretch      30 sec x 5  Seated Heel Slide  x30  Long Arc Quad      x 30  Quad Set              x 30  Prone Flexion       x 20  Straight Leg Raise   x 20  Glut Set                  x 20  Ankle Pump          x 20  Semi Squat         x 20  . Therapeutic Exercise and NMR EXR  [] (56795) Provided verbal/tactile cueing for activities related to strengthening, flexibility, endurance, ROM for improvements in LE, proximal hip, and core control with self care, mobility, lifting, ambulation.  [] (18112) Provided verbal/tactile cueing for activities related to improving balance, coordination, kinesthetic sense, posture, motor skill, proprioception  to assist with LE, proximal hip, and core control in self care, mobility, lifting, ambulation and eccentric single leg control.  [address] and Therapeutic Activities:    [] (74081 or 77981) Provided verbal/tactile cueing for activities related to improving balance, coordination, kinesthetic sense, posture, motor skill, proprioception and motor activation to allow for proper function of core, proximal hip and LE with self care and ADLs and functional mobility.   [] (47955) Gait Re-education- Provided training and instruction to the patient for proper LE, core and proximal hip recruitment and positioning and eccentric body weight control with ambulation re-education including up and down stairs     Gait Training:  [] (61661) Provided training and instruction to the patient for proper postural muscle recruitment and positioning with ambulation re-education     Home Exercise Program:    [x] (25894) Reviewed/Progressed HEP activities related to strengthening, flexibility, endurance, ROM of core, proximal hip and LE for functional self-care, mobility, lifting and ambulation/stair navigation   [] (66524)Reviewed/Progressed HEP activities related to improving balance, coordination, kinesthetic sense, posture, motor skill, proprioception of core, proximal hip and LE for self care, mobility, lifting, and ambulation/stair navigation      Manual Treatments:  PROM / STM / Oscillations-Mobs:  G-I, II, III, IV (PA's, Inf., Post.)  [] (20022) Provided manual therapy to mobilize LE, proximal hip and/or LS spine soft tissue/joints for the purpose of modulating pain, promoting relaxation,  increasing ROM, reducing/eliminating soft tissue swelling/inflammation/restriction, improving soft tissue extensibility and allowing for proper ROM for normal function with self care, mobility, lifting and ambulation.      Charges:  Timed Code Treatment Minutes: 60   Total Treatment Minutes: 60      [] EVAL (LOW) 45723 (typically 20 minutes face-to-face)  [] EVAL (MOD) 81547 (typically 30 minutes face-to-face)  [] EVAL (HIGH) 67275 (typically 45 minutes face-to-face)  [] RE-EVAL     [x] BR(44790) x  2   [] Dry needle 1 or 2 Muscles (06411)  [x] NMR (87597) x     [] Dry needle 3+ Muscles (97858)  [x] Manual (18870) x 1  [] Ultrasound (21068) x  [] TA (16203) x     [] Mech Traction (67880)  [] ES(attended) (98149)     [] ES (un) (67407):   [] Vasopump (35248) [] Ionto (85939)   [] Gait (63512)  [] Other:        ASSESSMENT:  See eval    Treatment/Activity Tolerance:  [x] Patient tolerated treatment well [] Patient limited by fatique  [] Patient limited by pain  [] Patient limited by other medical complications  [] Other:     Overall Progression Towards Functional goals/ Treatment Progress Update:  [x] Patient is progressing as expected towards functional goals listed. [] Progression is slowed due to complexities/Impairments listed. [] Progression has been slowed due to co-morbidities. [] Plan just implemented, too soon to assess goals progression <30days   [] Goals require adjustment due to lack of progress  [] Patient is not progressing as expected and requires additional follow up with physician  [] Other    Prognosis for POC: [x] Good [] Fair  [] Poor    Patient requires continued skilled intervention: [] Yes  [x] No  GOALS:  Patient stated goal:\" I want to learn exercises to help with surgery \"  []? Progressing: []? Met: []? Not Met: []? Adjusted     Therapist goals for Patient:   Short Term Goals: To be achieved in:   Goals-  Pt will have 120 flex, 0 ext to help him reach is [id number]  Pt will have 5-/5 strength to help surinder  reach his goals  Pt will ambualte with a straight cane to help her meet his goals             PLAN   LE arom, prom  strength, proprioception, gait, balance, functional activities. Mfr, joint mobs, mods as needed, hep. Progress as tolerated 1-3x wk x 6-8 wks         [x] Continue per plan of care [] Alter current plan (see comments)  [] Plan of care initiated [] Hold pending MD visit [x] Discharge    Electronically signed by: Darrell Syed PT   Note: If patient does not return for scheduled/recommended follow up visits, this note will serve as a discharge from care along with the most recent update on progress.

## 2021-04-20 ENCOUNTER — HOSPITAL ENCOUNTER (OUTPATIENT)
Dept: PHYSICAL THERAPY | Age: 66
Setting detail: THERAPIES SERIES
Discharge: HOME | End: 2021-04-20
Payer: MEDICARE

## 2021-04-20 PROCEDURE — 97140 MANUAL THERAPY 1/> REGIONS: CPT

## 2021-04-20 PROCEDURE — 97110 THERAPEUTIC EXERCISES: CPT

## 2021-04-20 PROCEDURE — 97112 NEUROMUSCULAR REEDUCATION: CPT

## 2021-04-20 NOTE — FLOWSHEET NOTE
initial 3/12/21 3/16/21 3/18/21 4/2/21 4/20/21   Flex 90 100 103 115 120   ext -10 -8 -5 -3 0   Strength- 3/5   3+/5 4/5                                                                                                     RESTRICTIONS/PRECAUTIONS:    Exercises/Interventions:     Therapeutic Ex (56783)   Min: Reps/Resistance Notes/CUES   Nu step seat 10 L2 x 6 min DOS  2/23/21   bike After rx for 6 min    Leg press 85# # 2 x 20, 60# x 40 left    Heel slide 2 min, assist to end range    Ext stretch , seated hams3 x 30 sec          Qs with towel 5 sec x 10    clams     Hip flex stretch off table     Prone flex  30 x 3, x 20 with assist to end range    incline 3 x 30 sec     Hamstring stretch on steps 60 x 2    QSS 30\" x 3     Sl circles X 30              Manual Intervention (37101)  Min:20      Mfr to entire leg, gastroc,hams prom, patellar mob gr 3 all directions, very tight in hams, prom all ranges with over pressure, medial patellar area tender, used medi cups x 4 min around knee today    NMR re-education (92620)  Min:     wobble X 3 min ea    Semi squat X 20    Step tap/ecc 6\" x 30    Step up 4\" x 30    bosu Ski x 2 min    pilates press 5 sp x 30, left 2sp x 30    Toe raises 3 spX 30     sls X 2 min    Toe raises x 30     lunge X 30              Therapeutic Activity (18959)  Min:          Gait Training (17815)  Min:           Modalities  Min:            Other Therapeutic Activities:   Patient was thoroughly educated on this date regarding prehabilitation goals, importance of PT sessions in improving overall ROM, strength and stability prior to surgery, and how prehabilitation will facilitate improved post-operative outcomes. The patient was educated on and instructed in HEP as listed. The patient was given a detailed handout for exercises to initiate in the hospital post-operatively as well as at home.  The discharge plan from the hospital was reviewed with the patient; specifically, to reduce length of hospital stay and to minimize time before reinitiating outpatient physical therapy after surgery. Education regarding early mobility post-operatively in the hospital and emphasis on working with both physical therapy and nursing staff to achieve ambulation goal was provided. The patient was highly encouraged to attend joint class in hospital prior to surgery for further instructions on pre and post-surgical care. Also, education regarding goals and expectations was provided; specifically, knee flexion range of motion greater than or equal to 90 degrees and 0 degrees knee extension to promote improved gait mechanics and ambulation. The patient was encouraged to utilize ice/cold pack after surgery to address pain, minimize swelling as often as possible. It is in my medical opinion that this patient is clear from all physical barriers prior to consideration for surgery, activity modifications prior to and post operatively have been discussed with this patient as well as discharge planning and is cleared for surgery from physical therapy perspective. Home Exercise Program:  Patient instructed in the above exercises for HEP. Patient verbalized/demonstrated understanding and was issued written handout  Seated hamstring Stretch 30 sec x 5  Seated Calf Stretch           30 sec x 5  Seated Flexion Stretch      30 sec x 5  Seated Heel Slide  x30  Long Arc Quad      x 30  Quad Set              x 30  Prone Flexion       x 20  Straight Leg Raise   x 20  Glut Set                  x 20  Ankle Pump          x 20  Semi Squat         x 20  .        Therapeutic Exercise and NMR EXR  [] (94394) Provided verbal/tactile cueing for activities related to strengthening, flexibility, endurance, ROM for improvements in LE, proximal hip, and core control with self care, mobility, lifting, ambulation.  [] (11175) Provided verbal/tactile cueing for activities related to improving balance, coordination, kinesthetic sense, posture, motor skill, proprioception  to assist with LE, proximal hip, and core control in self care, mobility, lifting, ambulation and eccentric single leg control. [address] and Therapeutic Activities:    [] (18131 or 77466) Provided verbal/tactile cueing for activities related to improving balance, coordination, kinesthetic sense, posture, motor skill, proprioception and motor activation to allow for proper function of core, proximal hip and LE with self care and ADLs and functional mobility.   [] (93237) Gait Re-education- Provided training and instruction to the patient for proper LE, core and proximal hip recruitment and positioning and eccentric body weight control with ambulation re-education including up and down stairs     Gait Training:  [] (65747) Provided training and instruction to the patient for proper postural muscle recruitment and positioning with ambulation re-education     Home Exercise Program:    [x] (87318) Reviewed/Progressed HEP activities related to strengthening, flexibility, endurance, ROM of core, proximal hip and LE for functional self-care, mobility, lifting and ambulation/stair navigation   [] (59254)Reviewed/Progressed HEP activities related to improving balance, coordination, kinesthetic sense, posture, motor skill, proprioception of core, proximal hip and LE for self care, mobility, lifting, and ambulation/stair navigation      Manual Treatments:  PROM / STM / Oscillations-Mobs:  G-I, II, III, IV (PA's, Inf., Post.)  [] (05411) Provided manual therapy to mobilize LE, proximal hip and/or LS spine soft tissue/joints for the purpose of modulating pain, promoting relaxation,  increasing ROM, reducing/eliminating soft tissue swelling/inflammation/restriction, improving soft tissue extensibility and allowing for proper ROM for normal function with self care, mobility, lifting and ambulation.      Charges:  Timed Code Treatment Minutes: 60   Total Treatment Minutes: 60      [] EVAL (LOW) 81645 (typically 20 minutes face-to-face)  [] EVAL (MOD) 37191 (typically 30 minutes face-to-face)  [] EVAL (HIGH) 51482 (typically 45 minutes face-to-face)  [] RE-EVAL     [x] PB(12756) x  2   [] Dry needle 1 or 2 Muscles (04556)  [x] NMR (78148) x 1    [] Dry needle 3+ Muscles (35743)  [x] Manual (07529) x 1  [] Ultrasound (54307) x  [] TA (11110) x     [] Mech Traction (34071)  [] ES(attended) (42864)     [] ES (un) (22421):   [] Vasopump (48743) [] Ionto (07111)   [] Gait (96526)  [] Other:        ASSESSMENT:  See eval    Treatment/Activity Tolerance:  [x] Patient tolerated treatment well [] Patient limited by fatique  [] Patient limited by pain  [] Patient limited by other medical complications  [] Other:     Overall Progression Towards Functional goals/ Treatment Progress Update:  [x] Patient is progressing as expected towards functional goals listed. [] Progression is slowed due to complexities/Impairments listed. [] Progression has been slowed due to co-morbidities. [] Plan just implemented, too soon to assess goals progression <30days   [] Goals require adjustment due to lack of progress  [] Patient is not progressing as expected and requires additional follow up with physician  [] Other    Prognosis for POC: [x] Good [] Fair  [] Poor    Patient requires continued skilled intervention: [] Yes  [x] No  GOALS:  Patient stated goal:\" I want to learn exercises to help with surgery \"  []? Progressing: []? Met: []? Not Met: []? Adjusted     Therapist goals for Patient:   Short Term Goals: To be achieved in:   Goals-  Pt will have 120 flex, 0 ext to help him reach is [id number]  Pt will have 5-/5 strength to help surinder  reach his goals  Pt will ambualte with a straight cane to help her meet his goals             PLAN   LE arom, prom  strength, proprioception, gait, balance, functional activities. Mfr, joint mobs, mods as needed, hep.  Progress as tolerated 1-3x wk x 6-8 wks         [x] Continue per plan of care [] Alter current plan (see comments)  [] Plan of care initiated [] Hold pending MD visit [x] Discharge    Electronically signed by: Reagan No PT   Note: If patient does not return for scheduled/recommended follow up visits, this note will serve as a discharge from care along with the most recent update on progress.

## 2021-04-22 ENCOUNTER — HOSPITAL ENCOUNTER (OUTPATIENT)
Dept: PHYSICAL THERAPY | Age: 66
Setting detail: THERAPIES SERIES
Discharge: HOME | End: 2021-04-22
Payer: MEDICARE

## 2021-04-22 PROCEDURE — 97112 NEUROMUSCULAR REEDUCATION: CPT

## 2021-04-22 PROCEDURE — 97140 MANUAL THERAPY 1/> REGIONS: CPT

## 2021-04-22 PROCEDURE — 97110 THERAPEUTIC EXERCISES: CPT

## 2021-04-22 NOTE — FLOWSHEET NOTE
better \"    OBJECTIVE: see eval   Test measurements:    initial 3/12/21 3/16/21 3/18/21 4/2/21 4/20/21   Flex 90 100 103 115 120   ext -10 -8 -5 -3 0   Strength- 3/5   3+/5 4/5                                                                                                     RESTRICTIONS/PRECAUTIONS:    Exercises/Interventions:     Therapeutic Ex (86806)   Min: Reps/Resistance Notes/CUES   Nu step seat 10 L2 x 6 min DOS  2/23/21   bike After rx for 6 min    Leg press 90# # 2 x 20, 60# x 40 left    Heel slide 2 min, assist to end range    Ext stretch , seated hams3 x 30 sec          Qs with towel 5 sec x 10    clams     Hip flex stretch off table     Prone flex  30 x 3, x 20 with assist to end range    incline 3 x 30 sec     Hamstring stretch on steps 60 x 2    QSS 30\" x 3     Sl circles X 30              Manual Intervention (52153)  Min:20      Mfr to entire leg, gastroc,hams prom, patellar mob gr 3 all directions, very tight in hams, prom all ranges with over pressure, medial patellar area tender, used medi cups x 4 min around knee today    NMR re-education (53914)  Min:     wobble X 3 min ea    Semi squat X 20    Step tap/ecc 6\" x 30    Step up 4\" x 30    bosu Ski x 2 min    pilates press 5 sp x 30, left 2sp x 30    Toe raises 3 spX 30     sls X 2 min         lunge X 30              Therapeutic Activity (30788)  Min:          Gait Training (31089)  Min:           Modalities  Min:            Other Therapeutic Activities:   Patient was thoroughly educated on this date regarding prehabilitation goals, importance of PT sessions in improving overall ROM, strength and stability prior to surgery, and how prehabilitation will facilitate improved post-operative outcomes. The patient was educated on and instructed in HEP as listed. The patient was given a detailed handout for exercises to initiate in the hospital post-operatively as well as at home.  The discharge plan from the hospital was reviewed with the patient; specifically, to reduce length of hospital stay and to minimize time before reinitiating outpatient physical therapy after surgery. Education regarding early mobility post-operatively in the hospital and emphasis on working with both physical therapy and nursing staff to achieve ambulation goal was provided. The patient was highly encouraged to attend joint class in hospital prior to surgery for further instructions on pre and post-surgical care. Also, education regarding goals and expectations was provided; specifically, knee flexion range of motion greater than or equal to 90 degrees and 0 degrees knee extension to promote improved gait mechanics and ambulation. The patient was encouraged to utilize ice/cold pack after surgery to address pain, minimize swelling as often as possible. It is in my medical opinion that this patient is clear from all physical barriers prior to consideration for surgery, activity modifications prior to and post operatively have been discussed with this patient as well as discharge planning and is cleared for surgery from physical therapy perspective. Home Exercise Program:  Patient instructed in the above exercises for HEP. Patient verbalized/demonstrated understanding and was issued written handout  Seated hamstring Stretch 30 sec x 5  Seated Calf Stretch           30 sec x 5  Seated Flexion Stretch      30 sec x 5  Seated Heel Slide  x30  Long Arc Quad      x 30  Quad Set              x 30  Prone Flexion       x 20  Straight Leg Raise   x 20  Glut Set                  x 20  Ankle Pump          x 20  Semi Squat         x 20  .        Therapeutic Exercise and NMR EXR  [] (85930) Provided verbal/tactile cueing for activities related to strengthening, flexibility, endurance, ROM for improvements in LE, proximal hip, and core control with self care, mobility, lifting, ambulation.  [] (34930) Provided verbal/tactile cueing for activities related to improving balance, coordination, Continue per plan of care [] Alter current plan (see comments)  [] Plan of care initiated [] Hold pending MD visit [x] Discharge    Electronically signed by: Polina Patel PT   Note: If patient does not return for scheduled/recommended follow up visits, this note will serve as a discharge from care along with the most recent update on progress.

## 2021-04-27 ENCOUNTER — HOSPITAL ENCOUNTER (OUTPATIENT)
Dept: PHYSICAL THERAPY | Age: 66
Setting detail: THERAPIES SERIES
Discharge: HOME | End: 2021-04-27
Payer: MEDICARE

## 2021-04-27 PROCEDURE — 97112 NEUROMUSCULAR REEDUCATION: CPT

## 2021-04-27 PROCEDURE — 97140 MANUAL THERAPY 1/> REGIONS: CPT

## 2021-04-27 PROCEDURE — 97110 THERAPEUTIC EXERCISES: CPT

## 2021-04-27 NOTE — FLOWSHEET NOTE
Flowsheet Note  Date:  2021    Patient Name:  Ashanti Orourke    :  1955  MRN: [id number]    Pertinent Medical History:Additional Pertinent Hx: cancer    Medical/Treatment Diagnosis Information:  Status post total left knee replacement    · Treatment Diagnosis: decreased abilty to ambulate and function    Insurance/Certification information:  PT Insurance Information: [facility] Medicare  Physician Information:  Referring Practitioner: Dr Pop Feeling of care signed (Y/N): routed        Visit # POC/Insurance Allowable Auth Needed   13 14 Per insurance by  []Yes   []No     Latex Allergy:  [x]NO      []YES  Preferred Language for Healthcare:   [x]English       []Other:    Functional Scale:       Date assessed: at [facility] [place]  womac- 35    Pain level:  0/10     History of Injury: Pt is a [age]y/o female with a hx of left knee pain with a resultant left tka on 21. She was seen at home for a few weeks and comes to start PT. She c/o constant aching pain which is worst in the am or after walking a lot. She is walking with a cane at this time. She can't do steps normal.  She hopes to return to gardening and walking  normal.     SUBJECTIVE:  Pt states, \" Im ready for surgery \"  21 Patient reports knee is doing ok,just stiffness. 3/24/21  Pt states, \" still having some spasms at night \"  3/26/21 Pt reports no pain just stiffness in her knee. 21 Patient reports spasms in her left  knee decreasing,she is able to do more walking without knee getting stiff. 21  Pt states, \" Improving, going some without the cane. Not able to do steps normal yet or walk very far \"  21 Patient reports knee is improving,walking wihout the cane more.   21  Pt states, \" stiff and a little sore \"   21  Pt states, \" doing better \"  21  Pt states, \" Doing well from all aspects \"  21  Pt states, \" improving, walking better \"  4/27/21  Pt states, \" doing well, sore for a day or 2 after last rx \"    OBJECTIVE: see eval   Test measurements:    initial 3/12/21 3/16/21 3/18/21 4/2/21 4/20/21      Flex 90 100 103 115 120      ext -10 -8 -5 -3 0      Strength- 3/5   3+/5 4/5                                                                                                                                            RESTRICTIONS/PRECAUTIONS:    Exercises/Interventions:     Therapeutic Ex (98866)   Min: Reps/Resistance Notes/CUES   Nu step seat 10 L2 x 6 min DOS  2/23/21   bike After rx for 6 min    Leg press 100# # 2 x 20, 70# x 40 left    Heel slide 2 min, assist to end range    Ext stretch , seated hams3 x 30 sec          Qs with towel 5 sec x 10    clams     Hip flex stretch off table     Prone flex  30 x 3, x 20 with assist to end range    incline 3 x 30 sec     Hamstring stretch on steps 60 x 2    QSS 30\" x 3        Monster walk Blue x 2 min         Manual Intervention (05002)  Min:20      Mfr to entire leg, gastroc,hams prom, patellar mob gr 3 all directions, very tight in hams, prom all ranges with over pressure, medial patellar area tender, used medi cups x 4 min around knee today    NMR re-education (16172)  Min:     wobble X 3 min ea    Semi squat X 20    Step tap/ecc 6\" x 30    Step up 4\" x 30    bosu Ski x 2 min    pilates press 5 sp x 30, left 2sp x 30    Toe raises 3 spX 30     sls X 2 min         lunge X 30              Therapeutic Activity (93615)  Min:          Gait Training (99016)  Min:           Modalities  Min:            Other Therapeutic Activities:   Patient was thoroughly educated on this date regarding prehabilitation goals, importance of PT sessions in improving overall ROM, strength and stability prior to surgery, and how prehabilitation will facilitate improved post-operative outcomes. The patient was educated on and instructed in HEP as listed.  The patient was given a detailed handout for exercises to initiate in the hospital post-operatively as well as at home. The discharge plan from the hospital was reviewed with the patient; specifically, to reduce length of hospital stay and to minimize time before reinitiating outpatient physical therapy after surgery. Education regarding early mobility post-operatively in the hospital and emphasis on working with both physical therapy and nursing staff to achieve ambulation goal was provided. The patient was highly encouraged to attend joint class in hospital prior to surgery for further instructions on pre and post-surgical care. Also, education regarding goals and expectations was provided; specifically, knee flexion range of motion greater than or equal to 90 degrees and 0 degrees knee extension to promote improved gait mechanics and ambulation. The patient was encouraged to utilize ice/cold pack after surgery to address pain, minimize swelling as often as possible. It is in my medical opinion that this patient is clear from all physical barriers prior to consideration for surgery, activity modifications prior to and post operatively have been discussed with this patient as well as discharge planning and is cleared for surgery from physical therapy perspective. Home Exercise Program:  Patient instructed in the above exercises for HEP. Patient verbalized/demonstrated understanding and was issued written handout  Seated hamstring Stretch 30 sec x 5  Seated Calf Stretch           30 sec x 5  Seated Flexion Stretch      30 sec x 5  Seated Heel Slide  x30  Long Arc Quad      x 30  Quad Set              x 30  Prone Flexion       x 20  Straight Leg Raise   x 20  Glut Set                  x 20  Ankle Pump          x 20  Semi Squat         x 20  . Access Code: [email or web address]  URL: [email or web address]  Date: 04/27/2021  Prepared by: Esvin Kingsley    Exercises  Single Leg Stance - 1 x daily - 7 x weekly - 10 reps - 3 sets  Single Leg Balance with Four Way Reach and Rotation - 1 x daily - 7 x weekly - 10 reps - 3 sets  Squat - 1 x daily - 7 x weekly - 10 reps - 3 sets  Prone Quadriceps Stretch with Strap - 1 x daily - 7 x weekly - 10 reps - 3 sets  Standing Hamstring Stretch on Chair - 1 x daily - 7 x weekly - 10 reps - 3 sets  Standing Soleus Stretch on Step - 1 x daily - 7 x weekly - 10 reps - 3 sets  Standing Bilateral Gastroc Stretch with Step - 1 x daily - 7 x weekly - 10 reps - 3 sets       Therapeutic Exercise and NMR EXR  [] (71144) Provided verbal/tactile cueing for activities related to strengthening, flexibility, endurance, ROM for improvements in LE, proximal hip, and core control with self care, mobility, lifting, ambulation.  [] (45626) Provided verbal/tactile cueing for activities related to improving balance, coordination, kinesthetic sense, posture, motor skill, proprioception  to assist with LE, proximal hip, and core control in self care, mobility, lifting, ambulation and eccentric single leg control.  [facility] and Therapeutic Activities:    [] (60136 or 52168) Provided verbal/tactile cueing for activities related to improving balance, coordination, kinesthetic sense, posture, motor skill, proprioception and motor activation to allow for proper function of core, proximal hip and LE with self care and ADLs and functional mobility.   [] (62709) Gait Re-education- Provided training and instruction to the patient for proper LE, core and proximal hip recruitment and positioning and eccentric body weight control with ambulation re-education including up and down stairs     Gait Training:  [] (79171) Provided training and instruction to the patient for proper postural muscle recruitment and positioning with ambulation re-education     Home Exercise Program:    [x] (26746) Reviewed/Progressed HEP activities related to strengthening, flexibility, endurance, ROM of core, proximal hip and LE for functional self-care, mobility, lifting and ambulation/stair navigation   [] (01025)Reviewed/Progressed HEP activities related to improving balance, coordination, kinesthetic sense, posture, motor skill, proprioception of core, proximal hip and LE for self care, mobility, lifting, and ambulation/stair navigation      Manual Treatments:  PROM / STM / Oscillations-Mobs:  G-I, II, III, IV (PA's, Inf., Post.)  [] (11646) Provided manual therapy to mobilize LE, proximal hip and/or LS spine soft tissue/joints for the purpose of modulating pain, promoting relaxation,  increasing ROM, reducing/eliminating soft tissue swelling/inflammation/restriction, improving soft tissue extensibility and allowing for proper ROM for normal function with self care, mobility, lifting and ambulation. Charges:  Timed Code Treatment Minutes: 60   Total Treatment Minutes: 60      [] EVAL (LOW) 60346 (typically 20 minutes face-to-face)  [] EVAL (MOD) 38250 (typically 30 minutes face-to-face)  [] EVAL (HIGH) 91099 (typically 45 minutes face-to-face)  [] RE-EVAL     [x] BG(16902) x  2   [] Dry needle 1 or 2 Muscles (84654)  [x] NMR (13119) x 1    [] Dry needle 3+ Muscles (41781)  [x] Manual (93045) x 1  [] Ultrasound (51014) x  [] TA (81930) x     [] Mech Traction (56722)  [] ES(attended) (79876)     [] ES (un) (55035):   [] Vasopump (32053) [] Ionto (08626)   [] Gait (53316)  [] Other:        ASSESSMENT:  See eval    Treatment/Activity Tolerance:  [x] Patient tolerated treatment well [] Patient limited by fatique  [] Patient limited by pain  [] Patient limited by other medical complications  [] Other:     Overall Progression Towards Functional goals/ Treatment Progress Update:  [x] Patient is progressing as expected towards functional goals listed. [] Progression is slowed due to complexities/Impairments listed. [] Progression has been slowed due to co-morbidities.   [] Plan just implemented, too soon to assess goals progression <30days   [] Goals require adjustment due to lack of progress  [] Patient is not progressing as expected and requires additional follow up with physician  [] Other    Prognosis for POC: [x] Good [] Fair  [] Poor    Patient requires continued skilled intervention: [] Yes  [x] No  GOALS:  Patient stated goal:\" I want to learn exercises to help with surgery \"  []? Progressing: []? Met: []? Not Met: []? Adjusted     Therapist goals for Patient:   Short Term Goals: To be achieved in:   Goals-  Pt will have 120 flex, 0 ext to help him reach is [id number]  Pt will have 5-/5 strength to help surinder  reach his goals  Pt will ambualte with a straight cane to help her meet his goals             PLAN   LE arom, prom  strength, proprioception, gait, balance, functional activities. Mfr, joint mobs, mods as needed, hep. Progress as tolerated 1-3x wk x 6-8 wks. 1 more rx and d/c         [x] Continue per plan of care [] Alter current plan (see comments)  [] Plan of care initiated [] Hold pending MD visit [x] Discharge    Electronically signed by: Aretha Venegas, PT   Note: If patient does not return for scheduled/recommended follow up visits, this note will serve as a discharge from care along with the most recent update on progress.

## 2021-04-29 ENCOUNTER — HOSPITAL ENCOUNTER (OUTPATIENT)
Dept: PHYSICAL THERAPY | Age: 66
Setting detail: THERAPIES SERIES
Discharge: HOME | End: 2021-04-29
Payer: MEDICARE

## 2021-04-29 PROCEDURE — 97112 NEUROMUSCULAR REEDUCATION: CPT

## 2021-04-29 PROCEDURE — 97140 MANUAL THERAPY 1/> REGIONS: CPT

## 2021-04-29 PROCEDURE — 97110 THERAPEUTIC EXERCISES: CPT

## 2021-04-29 NOTE — FLOWSHEET NOTE
[facility] [place]. Kamari Vasquez [address]  Phone: [phone]   Fax:     [phone]     Physical Therapy Discharge Summary    Dear  ,    We had the pleasure of treating the following patient for physical therapy services at [address] [place] [address]. A summary of our findings can be found in the discharge summary below. If you have any questions or concerns regarding these findings, please do not hesitate to contact me at the office phone number above.   Thank you for the referral.     Physician Signature:________________________________Date:__________________  By signing above (or electronic signature), therapists plan is approved by physician      Overall Response to Treatment:   [x]Patient is responding well to treatment and improvement is noted with regards  to goals   []Patient should continue to improve in reasonable time if they continue HEP   []Patient has plateaued and is no longer responding to skilled PT intervention    []Patient is getting worse and would benefit from return to referring MD   []Patient unable to adhere to initial POC   []Other    Date range of Visits:3/12/21-21  Total Visits: 14                            Flowsheet Note  Date:  2021    Patient Name:  Lea Dunn    :  1955  MRN: [id number]    Pertinent Medical History:Additional Pertinent Hx: cancer    Medical/Treatment Diagnosis Information:  Status post total left knee replacement    · Treatment Diagnosis: decreased abilty to ambulate and function    Insurance/Certification information:  PT Insurance Information: [facility] Medicare  Physician Information:  Referring Practitioner: Dr Rupa Rowland of care signed (Y/N): routed        Visit # POC/Insurance Allowable Auth Needed   14 14 Per insurance by  []Yes   []No     Latex Allergy:  [x]NO      []YES  Preferred Language for Healthcare: 90 degrees and 0 degrees knee extension to promote improved gait mechanics and ambulation. The patient was encouraged to utilize ice/cold pack after surgery to address pain, minimize swelling as often as possible. It is in my medical opinion that this patient is clear from all physical barriers prior to consideration for surgery, activity modifications prior to and post operatively have been discussed with this patient as well as discharge planning and is cleared for surgery from physical therapy perspective. Home Exercise Program:  Patient instructed in the above exercises for HEP. Patient verbalized/demonstrated understanding and was issued written handout  Seated hamstring Stretch 30 sec x 5  Seated Calf Stretch           30 sec x 5  Seated Flexion Stretch      30 sec x 5  Seated Heel Slide  x30  Long Arc Quad      x 30  Quad Set              x 30  Prone Flexion       x 20  Straight Leg Raise   x 20  Glut Set                  x 20  Ankle Pump          x 20  Semi Squat         x 20  . Access Code: [email or web address]  URL: [email or web address]  Date: 04/27/2021  Prepared by: Amye Mohs    Exercises  Single Leg Stance - 1 x daily - 7 x weekly - 10 reps - 3 sets  Single Leg Balance with Four Way Reach and Rotation - 1 x daily - 7 x weekly - 10 reps - 3 sets  Squat - 1 x daily - 7 x weekly - 10 reps - 3 sets  Prone Quadriceps Stretch with Strap - 1 x daily - 7 x weekly - 10 reps - 3 sets  Standing Hamstring Stretch on Chair - 1 x daily - 7 x weekly - 10 reps - 3 sets  Standing Soleus Stretch on Step - 1 x daily - 7 x weekly - 10 reps - 3 sets  Standing Bilateral Gastroc Stretch with Step - 1 x daily - 7 x weekly - 10 reps - 3 sets       Therapeutic Exercise and NMR EXR  [] (24257) Provided verbal/tactile cueing for activities related to strengthening, flexibility, endurance, ROM for improvements in LE, proximal hip, and core control with self care, mobility, lifting, ambulation.  [] (31444) Provided verbal/tactile cueing for activities related to improving balance, coordination, kinesthetic sense, posture, motor skill, proprioception  to assist with LE, proximal hip, and core control in self care, mobility, lifting, ambulation and eccentric single leg control. [address] and Therapeutic Activities:    [] (55435 or 60488) Provided verbal/tactile cueing for activities related to improving balance, coordination, kinesthetic sense, posture, motor skill, proprioception and motor activation to allow for proper function of core, proximal hip and LE with self care and ADLs and functional mobility.   [] (55475) Gait Re-education- Provided training and instruction to the patient for proper LE, core and proximal hip recruitment and positioning and eccentric body weight control with ambulation re-education including up and down stairs     Gait Training:  [] (22638) Provided training and instruction to the patient for proper postural muscle recruitment and positioning with ambulation re-education     Home Exercise Program:    [x] (87923) Reviewed/Progressed HEP activities related to strengthening, flexibility, endurance, ROM of core, proximal hip and LE for functional self-care, mobility, lifting and ambulation/stair navigation   [] (06279)Reviewed/Progressed HEP activities related to improving balance, coordination, kinesthetic sense, posture, motor skill, proprioception of core, proximal hip and LE for self care, mobility, lifting, and ambulation/stair navigation      Manual Treatments:  PROM / STM / Oscillations-Mobs:  G-I, II, III, IV (PA's, Inf., Post.)  [] (42884) Provided manual therapy to mobilize LE, proximal hip and/or LS spine soft tissue/joints for the purpose of modulating pain, promoting relaxation,  increasing ROM, reducing/eliminating soft tissue swelling/inflammation/restriction, improving soft tissue extensibility and allowing for proper ROM for normal function with self care, mobility, lifting and ambulation. Charges:  Timed Code Treatment Minutes: 60   Total Treatment Minutes: 60      [] EVAL (LOW) 52033 (typically 20 minutes face-to-face)  [] EVAL (MOD) 53330 (typically 30 minutes face-to-face)  [] EVAL (HIGH) 81287 (typically 45 minutes face-to-face)  [] RE-EVAL     [x] BR(23106) x  2   [] Dry needle 1 or 2 Muscles (32386)  [x] NMR (13537) x 1    [] Dry needle 3+ Muscles (75136)  [x] Manual (84471) x 1  [] Ultrasound (71398) x  [] TA (30382) x     [] Mech Traction (72793)  [] ES(attended) (62544)     [] ES (un) (72015):   [] Vasopump (65549) [] Ionto (77934)   [] Gait (80888)  [] Other:        ASSESSMENT:  See eval    Treatment/Activity Tolerance:  [x] Patient tolerated treatment well [] Patient limited by fatique  [] Patient limited by pain  [] Patient limited by other medical complications  [] Other:     Overall Progression Towards Functional goals/ Treatment Progress Update:  [x] Patient is progressing as expected towards functional goals listed. [] Progression is slowed due to complexities/Impairments listed. [] Progression has been slowed due to co-morbidities. [] Plan just implemented, too soon to assess goals progression <30days   [] Goals require adjustment due to lack of progress  [] Patient is not progressing as expected and requires additional follow up with physician  [] Other    Prognosis for POC: [x] Good [] Fair  [] Poor    Patient requires continued skilled intervention: [] Yes  [x] No  GOALS:  Patient stated goal:\" I want to learn exercises to help with surgery \"  []? Progressing: [x]? Met: []? Not Met: []? Adjusted   1. Independent in HEP and progression per patient tolerance, in order to prevent re-injury. []?? Progressing: [x]? ? Met: []?? Not Met: []?? Adjusted  2. Patient will have a decrease in pain to facilitate improvement in movement, function, and ADLs as indicated by Functional Deficits. []?? Progressing: [x]? ? Met: []?? Not Met: []?? Adjusted     Long Term Goals:  To be achieved in: 8 weeks  1. Disability index score of 20%% or less for the LEFS to assist with reaching prior level of function.   []?? Progressing: [x]? ? Met: []?? Not Met: []?? Adjusted  2. Patient will demonstrate increased AROM to 75 % wfl to allow for proper joint functioning as indicated by patients Functional Deficits. []?? Progressing: [x]? ? Met: []?? Not Met: []?? Adjusted  3. Patient will demonstrate an increase in Strength to good proximal hip strength and control, within 4+/5 HHD in LE to allow for proper functional mobility as indicated by patients Functional Deficits. []?? Progressing: [x]? ? Met: []?? Not Met: []?? Adjusted  4. Patient will return to 80%functional activities without increased symptoms or restriction.   []?? Progressing: [x]? ? Met: []?? Not Met: []?? Adjusted  5. (patient specific functional goal)    []? ? Progressing: [x]? ? Met: []?? Not Met: []?? Adjusted       Therapist goals for Patient:   Short Term Goals: To be achieved in:   Goals-  Pt will have 120 flex, 0 ext to help him reach is [id number]  Pt will have 5-/5 strength to help surinder  reach his goals  Pt will ambualte with a straight cane to help her meet his goals             PLAN   LE arom, prom  strength, proprioception, gait, balance, functional activities. Mfr, joint mobs, mods as needed, hep. Progress as tolerated 1-3x wk x 6-8 wks. 1 more rx and d/c         [x] Continue per plan of care [] Alter current plan (see comments)  [] Plan of care initiated [] Hold pending MD visit [x] Discharge    Electronically signed by: Sujata Dixon PT   Note: If patient does not return for scheduled/recommended follow up visits, this note will serve as a discharge from care along with the most recent update on progress.

## 2021-05-01 ENCOUNTER — IMMUNIZATION (OUTPATIENT)
Dept: PRIMARY CARE CLINIC | Age: 66
End: 2021-05-01
Payer: MEDICARE

## 2021-05-01 PROCEDURE — 0012A COVID-19, MODERNA VACCINE 100MCG/0.5ML DOSE: CPT | Performed by: FAMILY MEDICINE

## 2021-05-01 PROCEDURE — 91301 COVID-19, MODERNA VACCINE 100MCG/0.5ML DOSE: CPT | Performed by: FAMILY MEDICINE

## 2021-05-17 ENCOUNTER — OFFICE VISIT (OUTPATIENT)
Dept: ORTHOPEDIC SURGERY | Age: 66
End: 2021-05-17

## 2021-05-17 VITALS — HEIGHT: 66 IN | RESPIRATION RATE: 16 BRPM | WEIGHT: 183 LBS | BODY MASS INDEX: 29.41 KG/M2

## 2021-05-17 DIAGNOSIS — Z96.652 STATUS POST TOTAL LEFT KNEE REPLACEMENT: Primary | ICD-10-CM

## 2021-05-17 PROCEDURE — 99024 POSTOP FOLLOW-UP VISIT: CPT | Performed by: ORTHOPAEDIC SURGERY

## 2021-08-05 ENCOUNTER — HOSPITAL ENCOUNTER (OUTPATIENT)
Dept: WOMENS IMAGING | Age: 66
Discharge: HOME | End: 2021-08-05
Payer: MEDICARE

## 2021-08-05 DIAGNOSIS — Z12.31 VISIT FOR SCREENING MAMMOGRAM: ICD-10-CM

## 2021-08-05 PROCEDURE — 77067 SCR MAMMO BI INCL CAD: CPT

## 2021-09-28 ENCOUNTER — HOSPITAL ENCOUNTER (OUTPATIENT)
Age: 66
Discharge: HOME | End: 2021-09-28
Payer: MEDICARE

## 2021-09-28 ENCOUNTER — HOSPITAL ENCOUNTER (OUTPATIENT)
Dept: GENERAL RADIOLOGY | Age: 66
Discharge: HOME | End: 2021-09-28
Payer: MEDICARE

## 2021-09-28 DIAGNOSIS — R05.9 COUGH: ICD-10-CM

## 2021-09-28 PROCEDURE — 71046 X-RAY EXAM CHEST 2 VIEWS: CPT

## 2021-10-13 NOTE — TELEPHONE ENCOUNTER
Prescription Refill     Medication Name:  [facility]  Pharmacy: [facility]  Patient Contact Number:  [phone]

## 2021-10-14 RX ORDER — AMOXICILLIN 500 MG/1
CAPSULE ORAL
Qty: 4 CAPSULE | Refills: 1 | Status: SHIPPED | OUTPATIENT
Start: 2021-10-14

## 2022-01-19 ENCOUNTER — TELEPHONE (OUTPATIENT)
Dept: ORTHOPEDIC SURGERY | Age: 67
End: 2022-01-19

## 2022-01-19 RX ORDER — AMOXICILLIN 500 MG/1
CAPSULE ORAL
Qty: 4 CAPSULE | Refills: 1 | Status: SHIPPED | OUTPATIENT
Start: 2022-01-19

## 2022-01-19 NOTE — TELEPHONE ENCOUNTER
Prescription Refill     Medication Name:  [facility] APPT/JANUARY  Pharmacy:  [id number]  Patient Contact Number:  [phone]

## 2022-02-17 ENCOUNTER — OFFICE VISIT (OUTPATIENT)
Dept: ORTHOPEDIC SURGERY | Age: 67
End: 2022-02-17
Payer: MEDICARE

## 2022-02-17 VITALS — HEIGHT: 66 IN | RESPIRATION RATE: 18 BRPM | BODY MASS INDEX: 29.41 KG/M2 | WEIGHT: 183 LBS

## 2022-02-17 DIAGNOSIS — M17.11 PRIMARY OSTEOARTHRITIS OF RIGHT KNEE: ICD-10-CM

## 2022-02-17 DIAGNOSIS — Z96.652 STATUS POST TOTAL LEFT KNEE REPLACEMENT: Primary | ICD-10-CM

## 2022-02-17 PROCEDURE — 99213 OFFICE O/P EST LOW 20 MIN: CPT | Performed by: ORTHOPAEDIC SURGERY

## 2022-02-17 PROCEDURE — 20610 DRAIN/INJ JOINT/BURSA W/O US: CPT | Performed by: ORTHOPAEDIC SURGERY

## 2022-02-17 RX ORDER — BUPIVACAINE HYDROCHLORIDE 2.5 MG/ML
2 INJECTION, SOLUTION INFILTRATION; PERINEURAL ONCE
Status: COMPLETED | OUTPATIENT
Start: 2022-02-17 | End: 2022-02-17

## 2022-02-17 RX ORDER — TRIAMCINOLONE ACETONIDE 40 MG/ML
40 INJECTION, SUSPENSION INTRA-ARTICULAR; INTRAMUSCULAR ONCE
Status: COMPLETED | OUTPATIENT
Start: 2022-02-17 | End: 2022-02-17

## 2022-02-17 RX ADMIN — BUPIVACAINE HYDROCHLORIDE 5 MG: 2.5 INJECTION, SOLUTION INFILTRATION; PERINEURAL at 09:22

## 2022-02-17 RX ADMIN — TRIAMCINOLONE ACETONIDE 40 MG: 40 INJECTION, SUSPENSION INTRA-ARTICULAR; INTRAMUSCULAR at 09:23

## 2022-02-17 NOTE — PROGRESS NOTES
[facility]  Office Visit    Chief Complaint: Follow-up s/p left total knee arthroplasty; right knee pain    HPI:  Jamia Varela is a 79 y.o. who is here in follow-up of left total knee arthroplasty performed on February 23, 2021. She is doing well overall. She continues to be active. She does report right knee pain due to osteoarthritis like she has had in the past that is started to bother her more again recently. There are no new injuries. Patient Active Problem List   Diagnosis    Osteoarthritis of knee, unspecified       ROS:  Constitutional: denies fever, chills, weight loss  MSK: denies pain in other joints, muscle aches  Neurological: denies numbness, tingling, weakness    Exam:  Resp. rate 18, height 5' 6\" (1.676 m), weight 183 lb (83 kg). Appearance: sitting in exam room chair, appears to be in no acute distress, awake and alert  Resp: unlabored breathing on room air  Skin: warm, dry and intact with out erythema or significant increased temperature  Neuro: grossly intact both lower extremities. Intact sensation to light touch. Motor exam 4+ to 5/5 in all major motor groups. Left knee: Incision is healed. Range of motion is 0-135 degrees. Stable to ligamentous stress at full extension and at 90 degrees of flexion. Sensation is intact light touch. There is brisk capillary refill. There is 5/5 muscle strength in all muscle groups. Right knee: Examination reveals that knee range of motion is 0 to 135 degrees. There is varus deformity, positive crepitus, positive joint line tenderness, positive antalgic gait. Neurologically, plantar flexion and dorsiflexion is intact. 5/5 strength. Imaging:  3 views of the left knee were performed and interpreted today. Significant for a left total knee arthroplasty prosthesis in place with no signs of osteolysis, loosening, fracture, dislocation.     Prior right knee radiographs were reviewed and significant for tricompartmental degenerative changes with near bone-on-bone osteoarthritis of the medial compartment. Assessment:  S/p left total knee arthroplasty  Right knee osteoarthritis    Plan:  She is doing well recovering from left knee arthroplasty. She will continue activity as tolerated and home exercises. We discussed with the patient today the use of antibiotic prophylaxis prior to any dental procedures. We discussed that the antibiotic prophylaxis would be used to help prevent periprosthetic joint infections. If they were not offered antibiotics by the physician performing the procedure, they should contact our office that we may prescribe them antibiotics. We discussed the diagnosis and treatment options for her right knee osteoarthritis. She is interested in a steroid injection today. This was performed as described below. She will follow-up as needed for her right knee. Follow-up on an annual basis for the left knee. Procedure:  After verbal consent was obtained, the patient's right knee was prepped with alcohol. Skin was anesthetized with ethyl chloride. The knee was then injected under sterile technique with 2mL of 0.25% marcaine and 1 mL of 40 mg/mL Kenalog. A bandage was applied. The patient tolerated the procedure well and there were no complications. Total time spent on today's encounter was at least 23 minutes. This time included reviewing prior notes, radiographs, and lab results when available, reviewing history obtained by medical assistant, performing history and physical exam, reviewing tests/radiographs with the patient, counseling the patient, ordering medications or tests, documentation in the electronic health record, and coordination of care. This dictation was done with Dragon dictation and may contain mechanical errors related to translation.

## 2022-06-02 ENCOUNTER — TELEPHONE (OUTPATIENT)
Dept: ORTHOPEDIC SURGERY | Age: 67
End: 2022-06-02

## 2022-06-02 RX ORDER — AMOXICILLIN 500 MG/1
CAPSULE ORAL
Qty: 4 CAPSULE | Refills: 1 | Status: SHIPPED | OUTPATIENT
Start: 2022-06-02

## 2022-06-02 NOTE — TELEPHONE ENCOUNTER
The patient is calling to speak to someone who could tell her what she needs to do next as far as her knee.

## 2022-06-15 ENCOUNTER — TELEPHONE (OUTPATIENT)
Dept: ORTHOPEDIC SURGERY | Age: 67
End: 2022-06-15

## 2022-06-15 NOTE — TELEPHONE ENCOUNTER
General Question     Subject: CPT CODE FOR A GEL SHOT  Patient and /or Facility Request: [facility] ( [facility])  Contact Number: [phone]    [facility] FROM [facility] CALLED IN REGARD OF A CPT CODE FOR A GEL SHOT FOR THE PATIENT THE PROVIDERS NUMBER LINE IS THE NUMBER LISTED ABOVE

## 2022-06-21 ENCOUNTER — PATIENT MESSAGE (OUTPATIENT)
Dept: ORTHOPEDIC SURGERY | Age: 67
End: 2022-06-21

## 2022-06-21 DIAGNOSIS — M17.11 PRIMARY OSTEOARTHRITIS OF RIGHT KNEE: Primary | ICD-10-CM

## 2022-06-24 ENCOUNTER — OFFICE VISIT (OUTPATIENT)
Dept: ORTHOPEDIC SURGERY | Age: 67
End: 2022-06-24
Payer: MEDICARE

## 2022-06-24 DIAGNOSIS — M25.561 CHRONIC PAIN OF RIGHT KNEE: Primary | ICD-10-CM

## 2022-06-24 DIAGNOSIS — M76.32 ILIOTIBIAL BAND SYNDROME OF LEFT SIDE: ICD-10-CM

## 2022-06-24 DIAGNOSIS — M17.11 PRIMARY OSTEOARTHRITIS OF RIGHT KNEE: ICD-10-CM

## 2022-06-24 DIAGNOSIS — G89.29 CHRONIC PAIN OF RIGHT KNEE: Primary | ICD-10-CM

## 2022-06-24 PROCEDURE — 20610 DRAIN/INJ JOINT/BURSA W/O US: CPT | Performed by: PHYSICIAN ASSISTANT

## 2022-06-24 PROCEDURE — 99213 OFFICE O/P EST LOW 20 MIN: CPT | Performed by: PHYSICIAN ASSISTANT

## 2022-06-24 PROCEDURE — 1123F ACP DISCUSS/DSCN MKR DOCD: CPT | Performed by: PHYSICIAN ASSISTANT

## 2022-06-24 RX ORDER — TRIAMCINOLONE ACETONIDE 40 MG/ML
40 INJECTION, SUSPENSION INTRA-ARTICULAR; INTRAMUSCULAR ONCE
Status: COMPLETED | OUTPATIENT
Start: 2022-06-24 | End: 2022-06-24

## 2022-06-24 RX ORDER — LIDOCAINE HYDROCHLORIDE 10 MG/ML
2 INJECTION, SOLUTION INFILTRATION; PERINEURAL ONCE
Status: COMPLETED | OUTPATIENT
Start: 2022-06-24 | End: 2022-06-24

## 2022-06-24 RX ADMIN — LIDOCAINE HYDROCHLORIDE 2 ML: 10 INJECTION, SOLUTION INFILTRATION; PERINEURAL at 10:13

## 2022-06-24 RX ADMIN — TRIAMCINOLONE ACETONIDE 40 MG: 40 INJECTION, SUSPENSION INTRA-ARTICULAR; INTRAMUSCULAR at 10:14

## 2022-06-24 RX ADMIN — TRIAMCINOLONE ACETONIDE 40 MG: 40 INJECTION, SUSPENSION INTRA-ARTICULAR; INTRAMUSCULAR at 10:04

## 2022-06-24 RX ADMIN — LIDOCAINE HYDROCHLORIDE 2 ML: 10 INJECTION, SOLUTION INFILTRATION; PERINEURAL at 10:03

## 2022-06-29 NOTE — PROGRESS NOTES
This dictation was done with [employer] dictation and may contain mechanical errors related to translation. There were no vitals taken for this visit. This is a pleasant 45-year-old female has ongoing pain in both of her knees but mostly related to osteoarthritis. Her left knee has some pinching and popping in the side consistent with iliotibial friction band syndrome. At today's visit she was sent for a standing AP lateral sunrise view of her right knee. The left knee had x-rays taken from 2/23.2021 of the left total knee replacement. She is getting ready to go to [facility] on a family vacation and is concerned about her walking being hampered by the iliotibial band friction syndrome on the left knee and the osteoarthritis in the right knee  This patient is here in follow-up for ongoing pain and dysfunction in their right knee. There x-rays done today included an AP lateral and a sunrise view of her right knee only showed joint space narrowing subchondral sclerosis with osteophyte formation. There is no obvious fracture seen and this was shown to the patient. They currently have had relief with injections of cortisone, anti-inflammatories and a home exercise program. There are here with increased pain over the last few days with swelling and dysfunction. On examination this is a well-developed patient in no acute distress. They are alert and oriented ×3. They walk without antalgia or any significant varus or valgus deformity. They have crepitus during flexion and extension in the patellofemoral joint. They have a negative Lachman and a negative Denis. There are good distal pulses and good dorsiflexion and plantarflexion strength present.   On her left knee she has a well-healed incision with 0 to 130 degrees of motion she has palpable tenderness over the insertion site of the iliotibial band and we talked about some specific stretching for this area    My impression is right knee osteoarthritis    Stable left total knee replacement with mild ITB syndrome    After a discussion of the multiple options, they consented to a cortisone shot. 1 ml of 40mg/ml Kenalog and 2 ml's of 1% lidocaine were injected into the right knee joint space. The leg was slightly flexed and injected just lateral to the patella tendon to under the patella. This was done with sterile technique and they tolerated it well. During today's visit, there was approximately 20 minutes of face-to-face discussion in regards to the patient's current condition and treatment options. More than 50 % of the time was counseling and coordination of care. This patient has a well documented history of osteoarthritis in their knee. They have trialed Nsaid medications, physical therapy exercises and steroid injections. They continue to have pain, swelling and dysfunction. At this junction, hyalgen injections are advisable. I gave them literature and discussed the risks and benefits with them and would like to seek pre-authorization for  I think she be a good candidate for Durolane    I went through a good stretch and strengthening exercise program. They understand that at some point, they will need a knee replacement.  And they will follow up with us on a when necessary basis over the next 3-6 months

## 2022-07-11 ENCOUNTER — TELEPHONE (OUTPATIENT)
Dept: ORTHOPEDIC SURGERY | Age: 67
End: 2022-07-11

## 2022-07-11 NOTE — TELEPHONE ENCOUNTER
Grupo Vásquez MA  [facility] Schedule Staff  7/7/22  EUFLEXXA  (SERIES OF 3)  RIGHT   KNEE.    [place] PRIMO Ludwig #   [id number].   DATES:  07/06/2022 - 09/03/2022   PER FAX FROM [employer]      I called the patient and left a message

## 2022-07-19 ENCOUNTER — TELEPHONE (OUTPATIENT)
Dept: ORTHOPEDIC SURGERY | Age: 67
End: 2022-07-19

## 2022-07-19 NOTE — TELEPHONE ENCOUNTER
General Question     Subject: EUFLEXXA INJECTION  Patient and /or Facility Request:Rebecca Gorman  Contact Number:[phone]    PATIENT CALLED WANTING CLARIFICATION ON EUFLEXXA INJECTION FOR HER KNEE. I OFFERED TO SCHEDULE THE APPOINTMENT (SERIES 3). PATIENT WANTED TO SPEAK WITH SOMEONE FROM DR SABA [facility] OFFICE. PLEASE CONTACT THE PATIENT AT THE ABOVE NUMBER.

## 2022-07-20 ENCOUNTER — TELEPHONE (OUTPATIENT)
Dept: ORTHOPEDIC SURGERY | Age: 67
End: 2022-07-20

## 2022-07-20 NOTE — TELEPHONE ENCOUNTER
General Question     Subject: RETURNING CALL   Patient and /or Facility Request: Adán Lee    Contact Number: [phone]    PATIENT RETURNING A CALL FROM OFFICE. Jhonathan Murillo PATIENT WAS CHECKING TO SEE IF THE INJECTIONS WAS APPROVED. Jhonathan Murillo PLEASE CALL PATIENT BACK AT THE ABOVE NUMBER.

## 2022-08-24 ENCOUNTER — HOSPITAL ENCOUNTER (OUTPATIENT)
Dept: WOMENS IMAGING | Age: 67
Discharge: HOME | End: 2022-08-24
Payer: MEDICARE

## 2022-08-24 DIAGNOSIS — Z12.31 VISIT FOR SCREENING MAMMOGRAM: ICD-10-CM

## 2022-08-24 PROCEDURE — 77067 SCR MAMMO BI INCL CAD: CPT

## 2023-02-03 RX ORDER — AMOXICILLIN 500 MG/1
CAPSULE ORAL
Qty: 4 CAPSULE | Refills: 1 | Status: SHIPPED | OUTPATIENT
Start: 2023-02-03

## 2023-02-16 ENCOUNTER — OFFICE VISIT (OUTPATIENT)
Dept: ORTHOPEDIC SURGERY | Age: 68
End: 2023-02-16

## 2023-02-16 VITALS — WEIGHT: 183 LBS | HEIGHT: 66 IN | BODY MASS INDEX: 29.41 KG/M2

## 2023-02-16 DIAGNOSIS — M25.552 HIP PAIN, BILATERAL: ICD-10-CM

## 2023-02-16 DIAGNOSIS — Z96.652 STATUS POST TOTAL LEFT KNEE REPLACEMENT: Primary | ICD-10-CM

## 2023-02-16 DIAGNOSIS — M17.11 PRIMARY OSTEOARTHRITIS OF RIGHT KNEE: ICD-10-CM

## 2023-02-16 DIAGNOSIS — M25.551 HIP PAIN, BILATERAL: ICD-10-CM

## 2023-02-16 RX ORDER — BUPIVACAINE HYDROCHLORIDE 2.5 MG/ML
2 INJECTION, SOLUTION INFILTRATION; PERINEURAL ONCE
Status: COMPLETED | OUTPATIENT
Start: 2023-02-16 | End: 2023-02-16

## 2023-02-16 RX ORDER — TRIAMCINOLONE ACETONIDE 40 MG/ML
40 INJECTION, SUSPENSION INTRA-ARTICULAR; INTRAMUSCULAR ONCE
Status: COMPLETED | OUTPATIENT
Start: 2023-02-16 | End: 2023-02-16

## 2023-02-16 RX ADMIN — BUPIVACAINE HYDROCHLORIDE 5 MG: 2.5 INJECTION, SOLUTION INFILTRATION; PERINEURAL at 09:54

## 2023-02-16 RX ADMIN — TRIAMCINOLONE ACETONIDE 40 MG: 40 INJECTION, SUSPENSION INTRA-ARTICULAR; INTRAMUSCULAR at 09:55

## 2023-02-16 NOTE — PROGRESS NOTES
This dictation was done with Dragon dictation and may contain mechanical errors related to translation. I have today reviewed with Sadie Barnes the clinically relevant, past medical history, medications, allergies, family history, social history, and Review Of Systems form the patients most recent history form & I have documented any details relevant to today's presenting complaints in my history below. Ms. Ridge Gorman's self-reported past medical history, medications, allergies, family history, social history, and Review Of Systems form has been scanned into the chart under the \"Media\" tab. Subjective:  Sadie Barnes is a 76 y.o. who is here as a established patient continuing to have improvement with the left knee from a replacement done on 3/23/2021. She is inhibited by some pain in her right knee causing osteoarthritis. I think this contributes to her having the osteoarthritis in her right knee affecting both hips resulting in bursitis of both hips as well as some bursitis in the left knee.   She was sent for x-rays including AP pelvis to the left into the right hip as well as AP lateral and sunrise view of her left knee      Patient Active Problem List   Diagnosis    Osteoarthritis of knee, unspecified           Current Outpatient Medications on File Prior to Visit   Medication Sig Dispense Refill    amoxicillin (AMOXIL) 500 MG capsule TAKE 4 TABLETS BY MOUTH 1 HOUR BEFORE DENTAL APPOINTMENT 4 capsule 1    amoxicillin (AMOXIL) 500 MG capsule 4 tablets by mouth 1 hour before dental appointment 4 capsule 1    amoxicillin (AMOXIL) 500 MG capsule 4 tablets by mouth 1 hour before dental appointment 4 capsule 1    amoxicillin (AMOXIL) 500 MG capsule 4 tablets by mouth 1 hour before dental appointment 4 capsule 1    scopolamine (TRANSDERM-SCOP) transdermal patch Place 1 patch onto the skin every 72 hours 2 patch 1    ibuprofen (ADVIL;MOTRIN) 200 MG CAPS Take 200 mg by mouth daily as needed for Fever HOLD for 14 days after knee surgery 120 capsule 3    naproxen sodium (ANAPROX) 220 MG tablet Take 1 tablet by mouth 2 times daily (with meals) HOLD for 14 days after knee surgery 60 tablet 3    aspirin EC 81 MG EC tablet Take 1 tablet by mouth 2 times daily for 14 days Please avoid missing doses. 28 tablet 0    Calcium Carb-Cholecalciferol (CALCIUM 600-D PO) Take 1 tablet by mouth daily       ketoconazole (NIZORAL) 2 % shampoo Apply topically daily as needed for Itching Apply topically daily as needed. clobetasol (TEMOVATE) 0.05 % cream Apply topically Twice a Week Apply topically 2 times weekly. calcium carbonate (TUMS) 500 MG chewable tablet Take 1 tablet by mouth as needed for Heartburn      Colloidal Oatmeal (GOLD BOND ECZEMA RELIEF) 2 % CREA Apply topically as needed      Multiple Vitamin (MULTIVITAMINS PO) Take 1 tablet by mouth daily      loratadine (CLARITIN) 10 MG capsule Take 10 mg by mouth daily as needed (for seasonal allergies)       Glucosamine Sulfate 1000 MG CAPS Take 1,000 mg by mouth      fluocinonide (LIDEX) 0.05 % cream Apply 1 mg topically Twice a Week       omeprazole (PRILOSEC) 20 MG delayed release capsule Take 20 mg by mouth as needed        No current facility-administered medications on file prior to visit. Objective:   Height 5' 6\" (1.676 m), weight 183 lb (83 kg). On examination is a very pleasant 78-year-old female who is alert and oriented x3 she is from sitting to standing very easily she is able to cross her legs comfortably she has some palpable tenderness over the lateral aspect of both hips and weakness to hip abduction is consistent with trochanteric bursitis. Right knee has a lot of crepitus during flexion extension through patellofemoral joint and some medial joint line tenderness consistent with osteoarthritis. She has good motion with mild to no instability.   There is no varus or valgus laxity in either knee the left knee has a well-healed incision 0 to 130 degrees of motion in the right knee has 1+ edema with crepitus and approximately 0 to 140 degrees of motion. Neuro exam grossly intact both lower extremities. Intact sensation to light touch. Motor exam 4+ to 5/5 in all major motor groups. Negative Pruitt's sign. Skin is warm, dry and intact with out erythema or significant increased temperature around the knee joint(s). There are no cutaneous lesions or lymphadenopathy present. X-RAYS:  X-rays taken of the knees show X alignment sizing for the prosthesis no lucencies fractures or bony abnormalities are noted overall good patella tracking. The hip x-rays show some mild joint space narrowing and actually a cam lesion on the left hip acetabular x-rays and femoral head. No fractures were seen no significant bone abnormalities were seen through either hip joint. She is some mild joint space loss and subchondral sclerosis      Assessment:  Right knee osteoarthritis, left knee stable total knee replacement, bilateral hip bursitis    Plan:  During today's visit, there was approximately 28 minutes of face-to-face discussion in regards to the patient's current condition and treatment options. More than 50 % of the time was counseling and coordination of care as indicated above. We talked about short and long-term expectations and that other things are connected she was then given a cortisone shot for her right knee osteoarthritis. She tolerated well and we went through home exercise program.  For the pes bursitis in her left knee and the trochanteric bursitis in both of her hips that this continues despite the exercises and the injection we will have her follow-up with us in 4 to 6 weeks as needed      PROCEDURE NOTE:  After a discussion of the multiple options, they consented to a cortisone shot. 1 ml of 40mg/ml Kenalog and 2 ml's of 0.25%Marcaine were injected into the right knee joint space.   The leg was slightly flexed and injected just lateral to the patella tendon to under the patella. This was done with sterile technique and they tolerated it well.         They will schedule a follow up in as needed

## 2023-03-02 ENCOUNTER — TELEPHONE (OUTPATIENT)
Dept: ORTHOPEDIC SURGERY | Age: 68
End: 2023-03-02

## 2023-03-02 NOTE — TELEPHONE ENCOUNTER
Tri Olson MA  [facility] Schedule Staff  Juliet Christopher  (SERIES OF 3) RIGHT KNEE     APPROVED FOR [address] #  [id number]. DATES:  2/24/23 - 4/24/23. PER FAX FROM [employer].      I called the patient and left a message

## 2023-03-13 ENCOUNTER — TELEPHONE (OUTPATIENT)
Dept: ORTHOPEDIC SURGERY | Age: 68
End: 2023-03-13

## 2023-03-13 NOTE — TELEPHONE ENCOUNTER
General Question     Subject: PATIENT RETURNING CALL. PLEASE ADVISE.   Patient Annemarie Prado  Contact Number: [phone]

## 2023-04-17 ENCOUNTER — OFFICE VISIT (OUTPATIENT)
Dept: ORTHOPEDIC SURGERY | Age: 68
End: 2023-04-17

## 2023-04-17 DIAGNOSIS — M17.11 PRIMARY OSTEOARTHRITIS OF RIGHT KNEE: Primary | ICD-10-CM

## 2023-04-17 RX ORDER — HYALURONATE SODIUM 10 MG/ML
20 SYRINGE (ML) INTRAARTICULAR ONCE
Status: COMPLETED | OUTPATIENT
Start: 2023-04-17 | End: 2023-04-17

## 2023-04-17 RX ADMIN — Medication 20 MG: at 09:21

## 2023-04-19 NOTE — PROGRESS NOTES
Subjective:  right knee pain. She is here for the osteoarthritis in the knee. After discussion examination we decided to proceed with the 3rd Euflexxa  injection for the  right knee(s). Objective: There were no vitals taken for this visit. There is a milld joint effusion noted of the right knee(s). There is moderate pain with range of motion testing. There is no significant  instability noted. Neuro exam grossly intact both lower extremities. Intact sensation to light touch. Motor exam 4+ to 5/5 in all major motor groups. Negative Pruitt's sign. Skin is warm, dry and intact with out erythema or significant increased temperature around the knee joint(s). Assessment:  Degenerative Joint Disease of the right Knee(s). Plan:  Discussed  injections including all  risks and benefits including increased pain, drug reaction, infection, bleeding, lack of improvement and  neurovascular injury. All the questions were answered. PROCEDURE NOTE:  PRE-PROCEDURE DIAGNOSIS: DJD knee  POST-PROCEDURE DIAGNOSIS: DJD knee    With the patient's permission, her right knee was prepped in standard sterile fashion with  Alcohol. The prefilled injection of the preferred Eufflexxa was injected into the right lateral joint space compartment without difficulty. The patient tolerated the procedure(s) well without difficulty. A band-aid was applied. POST-PROCEDURE INSTRUCTIONS GIVEN TO PATIENT: The patient was advised to ice the knee for 15-20 minutes to relieve any injection site related pain. Decrease activity for the next 24 to 48 hours. May use prescription or OTC pain relievers as needed      FOLLOW-UP:   As directed or call or return to clinic if these symptoms worsen or fail to improve as anticipated. If at any time you are concerned you may contact the office for further instructions or care.

## 2023-06-07 ENCOUNTER — TELEPHONE (OUTPATIENT)
Dept: ORTHOPEDIC SURGERY | Age: 68
End: 2023-06-07

## 2023-06-07 RX ORDER — AMOXICILLIN 500 MG/1
CAPSULE ORAL
Qty: 4 CAPSULE | Refills: 1 | Status: SHIPPED | OUTPATIENT
Start: 2023-06-07

## 2023-06-07 NOTE — TELEPHONE ENCOUNTER
Prescription Refill     Medication Name:  AMOXICILLIN FOR UPCOMING DENTAL APPT     [place] [address] [place] [facility] [phone] Adonay Rosado [phone]    Patient Contact Number:  [phone]

## 2023-08-31 ENCOUNTER — HOSPITAL ENCOUNTER (OUTPATIENT)
Dept: WOMENS IMAGING | Age: 68
Discharge: HOME | End: 2023-08-31
Payer: MEDICARE

## 2023-08-31 DIAGNOSIS — Z12.31 BREAST CANCER SCREENING BY MAMMOGRAM: ICD-10-CM

## 2023-08-31 PROCEDURE — 77067 SCR MAMMO BI INCL CAD: CPT

## 2023-10-31 ENCOUNTER — TELEPHONE (OUTPATIENT)
Dept: ORTHOPEDIC SURGERY | Age: 68
End: 2023-10-31

## 2023-10-31 RX ORDER — AMOXICILLIN 500 MG/1
CAPSULE ORAL
Qty: 4 CAPSULE | Refills: 1 | Status: SHIPPED | OUTPATIENT
Start: 2023-10-31

## 2023-10-31 NOTE — TELEPHONE ENCOUNTER
Prescription Refill     Medication Name:  ANTIBIOTIC  Pharmacy: [id number] [facility]  Patient Contact Number:  [phone]      THE PT NEEDS AN ANTIBIOTIC CALLED INTO HER PHARMACY FOR HER DENTAL APPT TOMORROW. SHE GOES TO THE [facility] ON [address] IN [place].

## 2024-02-16 ENCOUNTER — OFFICE VISIT (OUTPATIENT)
Dept: ORTHOPEDIC SURGERY | Age: 69
End: 2024-02-16
Payer: MEDICARE

## 2024-02-16 VITALS — BODY MASS INDEX: 29.41 KG/M2 | RESPIRATION RATE: 16 BRPM | HEIGHT: 66 IN | WEIGHT: 183 LBS

## 2024-02-16 DIAGNOSIS — M17.11 PRIMARY OSTEOARTHRITIS OF RIGHT KNEE: ICD-10-CM

## 2024-02-16 DIAGNOSIS — Z96.652 STATUS POST TOTAL LEFT KNEE REPLACEMENT: Primary | ICD-10-CM

## 2024-02-16 PROCEDURE — 3017F COLORECTAL CA SCREEN DOC REV: CPT | Performed by: ORTHOPAEDIC SURGERY

## 2024-02-16 PROCEDURE — 99213 OFFICE O/P EST LOW 20 MIN: CPT | Performed by: ORTHOPAEDIC SURGERY

## 2024-02-16 PROCEDURE — 1123F ACP DISCUSS/DSCN MKR DOCD: CPT | Performed by: ORTHOPAEDIC SURGERY

## 2024-02-16 PROCEDURE — G8400 PT W/DXA NO RESULTS DOC: HCPCS | Performed by: ORTHOPAEDIC SURGERY

## 2024-02-16 PROCEDURE — G8417 CALC BMI ABV UP PARAM F/U: HCPCS | Performed by: ORTHOPAEDIC SURGERY

## 2024-02-16 PROCEDURE — 1036F TOBACCO NON-USER: CPT | Performed by: ORTHOPAEDIC SURGERY

## 2024-02-16 PROCEDURE — 1090F PRES/ABSN URINE INCON ASSESS: CPT | Performed by: ORTHOPAEDIC SURGERY

## 2024-02-16 PROCEDURE — G8484 FLU IMMUNIZE NO ADMIN: HCPCS | Performed by: ORTHOPAEDIC SURGERY

## 2024-02-16 PROCEDURE — G8427 DOCREV CUR MEDS BY ELIG CLIN: HCPCS | Performed by: ORTHOPAEDIC SURGERY

## 2024-02-16 NOTE — PROGRESS NOTES
Significant for a left total knee arthroplasty prosthesis in place with no signs of osteolysis, loosening, fracture, dislocation.    Prior right knee radiographs were reviewed and significant for tricompartmental degenerative changes with near bone-on-bone osteoarthritis of the medial compartment.    Assessment:  S/p left total knee arthroplasty  Right knee osteoarthritis    Plan:  She is doing well 3 years after left knee arthroplasty.  She will continue activity as tolerated and home exercises. We discussed with the patient today the use of antibiotic prophylaxis prior to any dental procedures.  We discussed that the antibiotic prophylaxis would be used to help prevent periprosthetic joint infections.  If they were not offered antibiotics by the physician performing the procedure, they should contact our office that we may prescribe them antibiotics.     We discussed the diagnosis and treatment options for her right knee osteoarthritis, including a steroid injection, viscosupplementation injection, total knee arthroplasty.  She will follow-up as needed for her right knee.    Follow-up on an annual basis for the left knee.    Total time spent on today's encounter was at least 23 minutes. This time included reviewing prior notes, radiographs, and lab results when available, reviewing history obtained by medical assistant, performing history and physical exam, reviewing tests/radiographs with the patient, counseling the patient, ordering medications or tests, documentation in the electronic health record, and coordination of care.     This dictation was done with Dragon dictation and may contain mechanical errors related to translation.

## 2024-03-06 RX ORDER — AMOXICILLIN 500 MG/1
CAPSULE ORAL
Qty: 4 CAPSULE | Refills: 0 | Status: SHIPPED | OUTPATIENT
Start: 2024-03-06

## 2024-06-27 RX ORDER — AMOXICILLIN 500 MG/1
CAPSULE ORAL
Qty: 4 CAPSULE | Refills: 0 | Status: SHIPPED | OUTPATIENT
Start: 2024-06-27

## 2024-07-12 RX ORDER — AMOXICILLIN 500 MG/1
CAPSULE ORAL
Qty: 4 CAPSULE | Refills: 0 | Status: SHIPPED | OUTPATIENT
Start: 2024-07-12

## 2024-07-12 NOTE — TELEPHONE ENCOUNTER
Prescription Refill     Medication Name:  AMOXICILLIN 500MG  Pharmacy: [facility]   Patient Contact Number: [phone]       PATIENT  IS CALLING REQUESTING ANOTHER REFILL FOR THE RX  DUE TO PATIENT HAS A DENTAL  APPT  [facility]  7/15/24 AND HAS TO GO BACK TO THE   DENTAL OFFICE THE FOLLOWING WEEK          PLEASE CALL PATIENT AT THE ABOVE NUMBER

## 2024-07-15 RX ORDER — AMOXICILLIN 500 MG/1
CAPSULE ORAL
Qty: 4 CAPSULE | Refills: 0 | OUTPATIENT
Start: 2024-07-15

## 2024-09-20 ENCOUNTER — OFFICE VISIT (OUTPATIENT)
Dept: ORTHOPEDIC SURGERY | Age: 69
End: 2024-09-20
Payer: MEDICARE

## 2024-09-20 VITALS — HEIGHT: 66 IN | BODY MASS INDEX: 29.41 KG/M2 | WEIGHT: 183 LBS

## 2024-09-20 DIAGNOSIS — Z96.652 STATUS POST TOTAL LEFT KNEE REPLACEMENT: ICD-10-CM

## 2024-09-20 DIAGNOSIS — M17.11 PRIMARY OSTEOARTHRITIS OF RIGHT KNEE: Primary | ICD-10-CM

## 2024-09-20 PROCEDURE — 99213 OFFICE O/P EST LOW 20 MIN: CPT | Performed by: ORTHOPAEDIC SURGERY

## 2024-09-20 PROCEDURE — 1123F ACP DISCUSS/DSCN MKR DOCD: CPT | Performed by: ORTHOPAEDIC SURGERY

## 2024-09-20 PROCEDURE — 1090F PRES/ABSN URINE INCON ASSESS: CPT | Performed by: ORTHOPAEDIC SURGERY

## 2024-09-20 PROCEDURE — G8417 CALC BMI ABV UP PARAM F/U: HCPCS | Performed by: ORTHOPAEDIC SURGERY

## 2024-09-20 PROCEDURE — 3017F COLORECTAL CA SCREEN DOC REV: CPT | Performed by: ORTHOPAEDIC SURGERY

## 2024-09-20 PROCEDURE — 1036F TOBACCO NON-USER: CPT | Performed by: ORTHOPAEDIC SURGERY

## 2024-09-20 PROCEDURE — G8427 DOCREV CUR MEDS BY ELIG CLIN: HCPCS | Performed by: ORTHOPAEDIC SURGERY

## 2024-09-20 PROCEDURE — G8400 PT W/DXA NO RESULTS DOC: HCPCS | Performed by: ORTHOPAEDIC SURGERY

## 2024-10-07 ENCOUNTER — OFFICE VISIT (OUTPATIENT)
Dept: ORTHOPEDIC SURGERY | Age: 69
End: 2024-10-07
Payer: MEDICARE

## 2024-10-07 DIAGNOSIS — M17.11 PRIMARY OSTEOARTHRITIS OF RIGHT KNEE: Primary | ICD-10-CM

## 2024-10-07 PROCEDURE — 20610 DRAIN/INJ JOINT/BURSA W/O US: CPT | Performed by: PHYSICIAN ASSISTANT

## 2024-10-07 PROCEDURE — G8417 CALC BMI ABV UP PARAM F/U: HCPCS | Performed by: PHYSICIAN ASSISTANT

## 2024-10-07 PROCEDURE — 1036F TOBACCO NON-USER: CPT | Performed by: PHYSICIAN ASSISTANT

## 2024-10-07 PROCEDURE — G8427 DOCREV CUR MEDS BY ELIG CLIN: HCPCS | Performed by: PHYSICIAN ASSISTANT

## 2024-10-07 PROCEDURE — 1123F ACP DISCUSS/DSCN MKR DOCD: CPT | Performed by: PHYSICIAN ASSISTANT

## 2024-10-07 PROCEDURE — G8484 FLU IMMUNIZE NO ADMIN: HCPCS | Performed by: PHYSICIAN ASSISTANT

## 2024-10-07 PROCEDURE — 99213 OFFICE O/P EST LOW 20 MIN: CPT | Performed by: PHYSICIAN ASSISTANT

## 2024-10-07 PROCEDURE — 3017F COLORECTAL CA SCREEN DOC REV: CPT | Performed by: PHYSICIAN ASSISTANT

## 2024-10-07 PROCEDURE — 1090F PRES/ABSN URINE INCON ASSESS: CPT | Performed by: PHYSICIAN ASSISTANT

## 2024-10-07 PROCEDURE — G8400 PT W/DXA NO RESULTS DOC: HCPCS | Performed by: PHYSICIAN ASSISTANT

## 2024-10-07 NOTE — PROGRESS NOTES
This dictation was done with [employer] dictation and may contain mechanical errors related to translation.  There were no vitals taken for this visit.    This is a very pleasant 69-year-old female who comes to the office with ongoing pain in her right knee from osteoarthritis.  She has had relief with this injection series in the past and she has been preapproved for the Orthovisc solution.    She currently has 1+ edema with some flexion extension crepitus around the patellofemoral joint some medial joint line tenderness but negative for any instability and no neurologic deficits.    My impression is right knee osteoarthritis.    We talked about short and long-term expectations and we decided to do the injection of the prepackaged amount of Orthovisc into the right knee.  This was done in the appropriate sterile fashion and she tolerated well.    We had a discussion about postinjection activities and we will see her back in 1 week

## 2024-10-14 ENCOUNTER — OFFICE VISIT (OUTPATIENT)
Dept: ORTHOPEDIC SURGERY | Age: 69
End: 2024-10-14
Payer: MEDICARE

## 2024-10-14 DIAGNOSIS — M17.11 PRIMARY OSTEOARTHRITIS OF RIGHT KNEE: Primary | ICD-10-CM

## 2024-10-14 PROCEDURE — 20610 DRAIN/INJ JOINT/BURSA W/O US: CPT | Performed by: ORTHOPAEDIC SURGERY

## 2024-10-14 NOTE — PROGRESS NOTES
[facility]  Office Visit    Chief Complaint: Follow-up for right knee pain    HPI:  Kimmy Gorman is a 69 y.o. who is here in follow-up of right knee pain due to osteoarthritis.  She had her first Orthovisc injection last week and reports minimal relief of symptoms at this point.  She denies adverse events.  She is here today for her second Orthovisc injection in the right knee.    Imaging:  Prior right knee radiographs were reviewed and significant for tricompartmental degenerative changes with near bone-on-bone osteoarthritis of the medial compartment.    Assessment:  Right knee osteoarthritis    Plan:  We discussed the diagnosis and treatment options.  She presents today for her second Orthovisc injection in the right knee.  This was performed as described below. She will follow-up next week for her third injection.    Procedure:  After verbal consent was obtained, the patient's right knee was prepped with alcohol. Skin was anesthetized with ethyl chloride.  The knee was then injected under sterile technique with the prepackaged dose of Orthovisc. A bandage was applied. The patient tolerated the procedure well and there were no complications.     This dictation was done with Dragon dictation and may contain mechanical errors related to translation.

## 2024-10-16 ENCOUNTER — HOSPITAL ENCOUNTER (OUTPATIENT)
Dept: WOMENS IMAGING | Age: 69
Discharge: HOME | End: 2024-10-16
Payer: MEDICARE

## 2024-10-16 VITALS — BODY MASS INDEX: 29.41 KG/M2 | HEIGHT: 66 IN | WEIGHT: 183 LBS

## 2024-10-16 DIAGNOSIS — Z12.31 VISIT FOR SCREENING MAMMOGRAM: ICD-10-CM

## 2024-10-16 PROCEDURE — 77063 BREAST TOMOSYNTHESIS BI: CPT

## 2024-10-21 ENCOUNTER — OFFICE VISIT (OUTPATIENT)
Dept: ORTHOPEDIC SURGERY | Age: 69
End: 2024-10-21
Payer: MEDICARE

## 2024-10-21 VITALS — HEIGHT: 66 IN | RESPIRATION RATE: 16 BRPM | BODY MASS INDEX: 29.41 KG/M2 | WEIGHT: 183 LBS

## 2024-10-21 DIAGNOSIS — M17.11 PRIMARY OSTEOARTHRITIS OF RIGHT KNEE: Primary | ICD-10-CM

## 2024-10-21 PROCEDURE — 20610 DRAIN/INJ JOINT/BURSA W/O US: CPT | Performed by: PHYSICIAN ASSISTANT

## 2024-10-24 NOTE — PROGRESS NOTES
This dictation was done with [employer] dictation and may contain mechanical errors related to translation.  Resp. rate 16, height 1.676 m (5' 6\"), weight 83 kg (183 lb).    This is a pleasant 69 female who is here in follow-up for her right knee osteoarthritis.  At today's visit she has 1+ edema with a little bit of crepitus during flexion extension through the patellofemoral joint.    My pression is bilateral right knee osteoarthritis    At this point we talked about short long-term expectations she has had 2 in a series of 3 Orthovisc [place] injections and we decided to proceed with the third.  With her consent she was injected into the right knee intra-articular space with the prepackaged amount of the Orthovisc.  She tolerated well we had discussion about postinjection activities.  She will follow-up with us in 5 or 6 weeks   Mixed Superficial And Nodular Bcc Histology Text: There were numerous aggregates of basaloid cells demonstrating clefting and surrounding inflammation.

## 2024-12-02 RX ORDER — AMOXICILLIN 500 MG/1
CAPSULE ORAL
Qty: 4 CAPSULE | Refills: 0 | Status: SHIPPED | OUTPATIENT
Start: 2024-12-02

## 2024-12-30 ENCOUNTER — HOSPITAL ENCOUNTER (OUTPATIENT)
Dept: WOMENS IMAGING | Age: 69
Discharge: HOME | End: 2024-12-30
Payer: MEDICARE

## 2024-12-30 DIAGNOSIS — Z78.0 POSTMENOPAUSAL STATE: ICD-10-CM

## 2024-12-30 PROCEDURE — 77080 DXA BONE DENSITY AXIAL: CPT

## 2025-04-07 RX ORDER — AMOXICILLIN 500 MG/1
CAPSULE ORAL
Qty: 4 CAPSULE | Refills: 0 | Status: SHIPPED | OUTPATIENT
Start: 2025-04-07

## 2025-04-22 RX ORDER — AMOXICILLIN 500 MG/1
CAPSULE ORAL
Qty: 4 CAPSULE | Refills: 0 | Status: SHIPPED | OUTPATIENT
Start: 2025-04-22

## (undated) DEVICE — GLOVE ORTHO 8   MSG9480

## (undated) DEVICE — BANDAGE COMPR W6INXL12FT SMOOTH FOR LIMB EXSANG ESMARCH

## (undated) DEVICE — BASIC SINGLE BASIN 1-LF: Brand: MEDLINE INDUSTRIES, INC.

## (undated) DEVICE — ZIMMER® STERILE DISPOSABLE TOURNIQUET CUFF WITH PLC, DUAL PORT, SINGLE BLADDER, 34 IN. (86 CM)

## (undated) DEVICE — COTTON UNDERCAST PADDING,CRIMPED FINISH: Brand: WEBRIL

## (undated) DEVICE — SOLUTION IV IRRIG POUR BRL 0.9% SODIUM CHL 2F7124

## (undated) DEVICE — 3M™ IOBAN™ 2 ANTIMICROBIAL INCISE DRAPE 6650EZ: Brand: IOBAN™ 2

## (undated) DEVICE — BLADE SURG SAW STD S STL OSC W/ SERR EDGE DISP

## (undated) DEVICE — BANDAGE COMPR W4INXL15FT BGE E SGL LAYERED CLP CLSR

## (undated) DEVICE — CORD RETRCT SIL

## (undated) DEVICE — PIN BNE FIX L110MM DIA32MM
Type: IMPLANTABLE DEVICE | Site: KNEE | Status: NON-FUNCTIONAL
Removed: 2021-02-23

## (undated) DEVICE — SUTURE STRATAFIX SPRL SZ 2 0 L14IN ABSRB UD MH L36MM 1 2 CIR SXMD2B401

## (undated) DEVICE — STERILE TOTAL KNEE DRAPE PACK: Brand: CARDINAL HEALTH

## (undated) DEVICE — DUAL CUT SAGITTAL BLADE

## (undated) DEVICE — GLOVE SURG SZ 8 L12IN FNGR THK79MIL GRN LTX FREE

## (undated) DEVICE — KIT DRP FOR RIO ROBOTIC ARM ASST SYS

## (undated) DEVICE — Z DUP USE 2701075 SYSTEM SKIN CLSR 42CM DERMBND PRINEO

## (undated) DEVICE — TOTAL KNEE: Brand: MEDLINE INDUSTRIES, INC.

## (undated) DEVICE — PIN BNE FIX TEMP L140MM DIA4MM MAKO

## (undated) DEVICE — BOOT POS LEG DEMAYO

## (undated) DEVICE — PAD,NON-ADHERENT,3X8,STERILE,LF,1/PK: Brand: MEDLINE

## (undated) DEVICE — Z DISCONTINUED USE 2716304 SUTURE STRATAFIX SPRL SZ 3-0 L12IN ABSRB UD FS-1 L24MM 3/8

## (undated) DEVICE — 1010 S-DRAPE TOWEL DRAPE 10/BX: Brand: STERI-DRAPE™

## (undated) DEVICE — KIT TRK KNEE PROC VIZADISC

## (undated) DEVICE — BANDAGE COMPR W6INXL4.5YD LTWT E EC SGL LAYERED CLP CLSR

## (undated) DEVICE — Z DISCONTINUED USE 2275686 GLOVE SURG SZ 8 L12IN FNGR THK13MIL WHT ISOLEX POLYISOPRENE

## (undated) DEVICE — GOWN SIRUS NONREIN XL W/TWL: Brand: MEDLINE INDUSTRIES, INC.

## (undated) DEVICE — SUTURE ABSORBABLE MONOFILAMENT 1-0 OS8 14 IN STRATAFIX SPRL SXPD2B202

## (undated) DEVICE — BANDAGE COMPR W6INXL10YD ST M E WHITE/BEIGE

## (undated) DEVICE — BLADE SAW W12.5XL70MM THK1MM RECIP DBL SIDE OFFSET

## (undated) DEVICE — KIT INT FIX FEM TIB CKPT MAKOPLASTY

## (undated) DEVICE — SOLUTION IV 1000ML 0.9% SOD CHL FOR IRRIG PLAS CONT